# Patient Record
Sex: MALE | Race: WHITE | NOT HISPANIC OR LATINO
[De-identification: names, ages, dates, MRNs, and addresses within clinical notes are randomized per-mention and may not be internally consistent; named-entity substitution may affect disease eponyms.]

---

## 2017-03-30 PROBLEM — Z00.00 ENCOUNTER FOR PREVENTIVE HEALTH EXAMINATION: Status: ACTIVE | Noted: 2017-03-30

## 2017-04-03 ENCOUNTER — APPOINTMENT (OUTPATIENT)
Dept: GASTROENTEROLOGY | Facility: CLINIC | Age: 72
End: 2017-04-03

## 2017-04-03 VITALS
OXYGEN SATURATION: 98 % | DIASTOLIC BLOOD PRESSURE: 90 MMHG | HEIGHT: 73 IN | HEART RATE: 90 BPM | SYSTOLIC BLOOD PRESSURE: 162 MMHG | BODY MASS INDEX: 37.11 KG/M2 | WEIGHT: 280 LBS | TEMPERATURE: 98.6 F

## 2017-04-03 DIAGNOSIS — R19.7 DIARRHEA, UNSPECIFIED: ICD-10-CM

## 2017-04-03 DIAGNOSIS — Z86.79 PERSONAL HISTORY OF OTHER DISEASES OF THE CIRCULATORY SYSTEM: ICD-10-CM

## 2017-04-03 DIAGNOSIS — Z82.49 FAMILY HISTORY OF ISCHEMIC HEART DISEASE AND OTHER DISEASES OF THE CIRCULATORY SYSTEM: ICD-10-CM

## 2017-04-03 DIAGNOSIS — R11.0 NAUSEA: ICD-10-CM

## 2017-04-03 DIAGNOSIS — Z87.39 PERSONAL HISTORY OF OTHER DISEASES OF THE MUSCULOSKELETAL SYSTEM AND CONNECTIVE TISSUE: ICD-10-CM

## 2017-04-03 DIAGNOSIS — R10.9 UNSPECIFIED ABDOMINAL PAIN: ICD-10-CM

## 2017-04-03 RX ORDER — RANOLAZINE 1000 MG/1
1000 TABLET, FILM COATED, EXTENDED RELEASE ORAL
Qty: 60 | Refills: 0 | Status: ACTIVE | COMMUNITY
Start: 2017-04-03

## 2017-04-03 RX ORDER — AMLODIPINE AND ATORVASTATIN 10; 80 MG/1; MG/1
10-80 TABLET, COATED ORAL DAILY
Qty: 30 | Refills: 0 | Status: ACTIVE | COMMUNITY
Start: 2017-04-03

## 2017-04-03 RX ORDER — LACTOBACILLUS RHAMNOSUS GG 10B CELL
CAPSULE ORAL
Qty: 30 | Refills: 0 | Status: ACTIVE | OUTPATIENT
Start: 2017-04-03

## 2017-04-03 RX ORDER — EZETIMIBE 10 MG/1
10 TABLET ORAL DAILY
Qty: 30 | Refills: 0 | Status: ACTIVE | COMMUNITY
Start: 2017-04-03

## 2017-06-05 ENCOUNTER — APPOINTMENT (OUTPATIENT)
Dept: GASTROENTEROLOGY | Facility: CLINIC | Age: 72
End: 2017-06-05

## 2017-07-07 ENCOUNTER — OUTPATIENT (OUTPATIENT)
Dept: OUTPATIENT SERVICES | Facility: HOSPITAL | Age: 72
LOS: 1 days | End: 2017-07-07
Payer: MEDICARE

## 2017-07-07 VITALS
HEART RATE: 88 BPM | WEIGHT: 270.07 LBS | DIASTOLIC BLOOD PRESSURE: 95 MMHG | RESPIRATION RATE: 16 BRPM | OXYGEN SATURATION: 98 % | TEMPERATURE: 98 F | HEIGHT: 73 IN | SYSTOLIC BLOOD PRESSURE: 161 MMHG

## 2017-07-07 DIAGNOSIS — I48.91 UNSPECIFIED ATRIAL FIBRILLATION: ICD-10-CM

## 2017-07-07 DIAGNOSIS — Z01.818 ENCOUNTER FOR OTHER PREPROCEDURAL EXAMINATION: ICD-10-CM

## 2017-07-07 DIAGNOSIS — Z90.49 ACQUIRED ABSENCE OF OTHER SPECIFIED PARTS OF DIGESTIVE TRACT: Chronic | ICD-10-CM

## 2017-07-07 LAB
ALBUMIN SERPL ELPH-MCNC: 4.4 G/DL — SIGNIFICANT CHANGE UP (ref 3.3–5)
ALP SERPL-CCNC: 98 U/L — SIGNIFICANT CHANGE UP (ref 40–120)
ALT FLD-CCNC: 30 U/L RC — SIGNIFICANT CHANGE UP (ref 10–45)
ANION GAP SERPL CALC-SCNC: 13 MMOL/L — SIGNIFICANT CHANGE UP (ref 5–17)
AST SERPL-CCNC: 25 U/L — SIGNIFICANT CHANGE UP (ref 10–40)
BILIRUB SERPL-MCNC: 1.2 MG/DL — SIGNIFICANT CHANGE UP (ref 0.2–1.2)
BUN SERPL-MCNC: 13 MG/DL — SIGNIFICANT CHANGE UP (ref 7–23)
CALCIUM SERPL-MCNC: 9.7 MG/DL — SIGNIFICANT CHANGE UP (ref 8.4–10.5)
CHLORIDE SERPL-SCNC: 96 MMOL/L — SIGNIFICANT CHANGE UP (ref 96–108)
CO2 SERPL-SCNC: 29 MMOL/L — SIGNIFICANT CHANGE UP (ref 22–31)
CREAT SERPL-MCNC: 1.07 MG/DL — SIGNIFICANT CHANGE UP (ref 0.5–1.3)
GLUCOSE SERPL-MCNC: 153 MG/DL — HIGH (ref 70–99)
HCT VFR BLD CALC: 39.6 % — SIGNIFICANT CHANGE UP (ref 39–50)
HGB BLD-MCNC: 14.2 G/DL — SIGNIFICANT CHANGE UP (ref 13–17)
MCHC RBC-ENTMCNC: 34.3 PG — HIGH (ref 27–34)
MCHC RBC-ENTMCNC: 35.9 GM/DL — SIGNIFICANT CHANGE UP (ref 32–36)
MCV RBC AUTO: 95.7 FL — SIGNIFICANT CHANGE UP (ref 80–100)
PLATELET # BLD AUTO: 206 K/UL — SIGNIFICANT CHANGE UP (ref 150–400)
POTASSIUM SERPL-MCNC: 3.9 MMOL/L — SIGNIFICANT CHANGE UP (ref 3.5–5.3)
POTASSIUM SERPL-SCNC: 3.9 MMOL/L — SIGNIFICANT CHANGE UP (ref 3.5–5.3)
PROT SERPL-MCNC: 8.1 G/DL — SIGNIFICANT CHANGE UP (ref 6–8.3)
RBC # BLD: 4.14 M/UL — LOW (ref 4.2–5.8)
RBC # FLD: 12.1 % — SIGNIFICANT CHANGE UP (ref 10.3–14.5)
SODIUM SERPL-SCNC: 138 MMOL/L — SIGNIFICANT CHANGE UP (ref 135–145)
WBC # BLD: 9 K/UL — SIGNIFICANT CHANGE UP (ref 3.8–10.5)
WBC # FLD AUTO: 9 K/UL — SIGNIFICANT CHANGE UP (ref 3.8–10.5)

## 2017-07-07 PROCEDURE — 93010 ELECTROCARDIOGRAM REPORT: CPT

## 2017-07-07 PROCEDURE — 80053 COMPREHEN METABOLIC PANEL: CPT

## 2017-07-07 PROCEDURE — 85027 COMPLETE CBC AUTOMATED: CPT

## 2017-07-07 PROCEDURE — 93005 ELECTROCARDIOGRAM TRACING: CPT

## 2017-07-07 NOTE — H&P CARDIOLOGY - PMH
Atrial fibrillation    CAD (coronary artery disease)    DM type 2 (diabetes mellitus, type 2)    HLD (hyperlipidemia)    HTN (hypertension)    Obesity    Obesity    SVT (Supraventricular Tachycardia)  Cardioversion 6 yrs ago

## 2017-07-07 NOTE — H&P CARDIOLOGY - HISTORY OF PRESENT ILLNESS
This is a 71 yr old male, former smoker,  with PMH of HTN, HLD, DM Type 2 ( last A1C unknown, w/o any complication, well managed as per patient),  MI s/p CABG 2003, afib ( on Eliquis).  Seen and evaluated by Md Delgado, presents here today for PST only, pt is to come back on Monday on 7/10 for TAQUERIA/cardioversion.  Currently asymptomatic denies any chest, palpitations, dyspnea, dizziness, N&V, HA.

## 2017-07-10 ENCOUNTER — APPOINTMENT (OUTPATIENT)
Dept: GASTROENTEROLOGY | Facility: CLINIC | Age: 72
End: 2017-07-10

## 2017-07-10 ENCOUNTER — OUTPATIENT (OUTPATIENT)
Dept: OUTPATIENT SERVICES | Facility: HOSPITAL | Age: 72
LOS: 1 days | End: 2017-07-10
Payer: MEDICARE

## 2017-07-10 DIAGNOSIS — Z90.49 ACQUIRED ABSENCE OF OTHER SPECIFIED PARTS OF DIGESTIVE TRACT: Chronic | ICD-10-CM

## 2017-07-10 DIAGNOSIS — Z01.818 ENCOUNTER FOR OTHER PREPROCEDURAL EXAMINATION: ICD-10-CM

## 2017-07-10 DIAGNOSIS — I48.91 UNSPECIFIED ATRIAL FIBRILLATION: ICD-10-CM

## 2017-07-10 PROCEDURE — 92960 CARDIOVERSION ELECTRIC EXT: CPT

## 2017-07-10 PROCEDURE — 93005 ELECTROCARDIOGRAM TRACING: CPT

## 2017-07-10 PROCEDURE — 93010 ELECTROCARDIOGRAM REPORT: CPT

## 2017-07-10 RX ORDER — APIXABAN 2.5 MG/1
5 TABLET, FILM COATED ORAL ONCE
Qty: 0 | Refills: 0 | Status: DISCONTINUED | OUTPATIENT
Start: 2017-07-10 | End: 2017-07-25

## 2017-09-14 ENCOUNTER — APPOINTMENT (OUTPATIENT)
Dept: GASTROENTEROLOGY | Facility: AMBULATORY MEDICAL SERVICES | Age: 72
End: 2017-09-14

## 2018-01-16 ENCOUNTER — APPOINTMENT (OUTPATIENT)
Dept: CARDIOLOGY | Facility: CLINIC | Age: 73
End: 2018-01-16
Payer: MEDICARE

## 2018-01-16 ENCOUNTER — NON-APPOINTMENT (OUTPATIENT)
Age: 73
End: 2018-01-16

## 2018-01-16 VITALS
DIASTOLIC BLOOD PRESSURE: 98 MMHG | HEART RATE: 134 BPM | BODY MASS INDEX: 33.25 KG/M2 | SYSTOLIC BLOOD PRESSURE: 178 MMHG | WEIGHT: 252 LBS | OXYGEN SATURATION: 96 %

## 2018-01-16 DIAGNOSIS — Z78.9 OTHER SPECIFIED HEALTH STATUS: ICD-10-CM

## 2018-01-16 DIAGNOSIS — R06.02 SHORTNESS OF BREATH: ICD-10-CM

## 2018-01-16 PROCEDURE — 99204 OFFICE O/P NEW MOD 45 MIN: CPT

## 2018-01-16 PROCEDURE — 93000 ELECTROCARDIOGRAM COMPLETE: CPT

## 2018-01-16 RX ORDER — RIFAXIMIN 550 MG/1
550 TABLET ORAL
Qty: 42 | Refills: 3 | Status: DISCONTINUED | COMMUNITY
Start: 2017-04-03 | End: 2018-01-16

## 2018-01-16 RX ORDER — APIXABAN 5 MG/1
5 TABLET, FILM COATED ORAL
Qty: 60 | Refills: 6 | Status: ACTIVE | COMMUNITY

## 2018-01-16 RX ORDER — EZETIMIBE 10 MG/1
10 TABLET ORAL
Refills: 0 | Status: DISCONTINUED | COMMUNITY
End: 2018-01-16

## 2018-01-16 RX ORDER — RANOLAZINE 1000 MG/1
1000 TABLET, FILM COATED, EXTENDED RELEASE ORAL
Refills: 0 | Status: DISCONTINUED | COMMUNITY
End: 2018-01-16

## 2018-01-16 RX ORDER — METOPROLOL TARTRATE 50 MG/1
50 TABLET, FILM COATED ORAL
Refills: 0 | Status: DISCONTINUED | COMMUNITY
End: 2018-01-16

## 2018-01-18 ENCOUNTER — CLINICAL ADVICE (OUTPATIENT)
Age: 73
End: 2018-01-18

## 2018-01-24 ENCOUNTER — OUTPATIENT (OUTPATIENT)
Dept: INPATIENT UNIT | Facility: HOSPITAL | Age: 73
LOS: 1 days | End: 2018-01-24
Payer: MEDICARE

## 2018-01-24 ENCOUNTER — TRANSCRIPTION ENCOUNTER (OUTPATIENT)
Age: 73
End: 2018-01-24

## 2018-01-24 VITALS
TEMPERATURE: 97 F | SYSTOLIC BLOOD PRESSURE: 161 MMHG | HEIGHT: 73 IN | OXYGEN SATURATION: 98 % | HEART RATE: 85 BPM | WEIGHT: 251.99 LBS | DIASTOLIC BLOOD PRESSURE: 87 MMHG | RESPIRATION RATE: 16 BRPM

## 2018-01-24 DIAGNOSIS — I49.9 CARDIAC ARRHYTHMIA, UNSPECIFIED: ICD-10-CM

## 2018-01-24 DIAGNOSIS — Z90.49 ACQUIRED ABSENCE OF OTHER SPECIFIED PARTS OF DIGESTIVE TRACT: Chronic | ICD-10-CM

## 2018-01-24 LAB
ALBUMIN SERPL ELPH-MCNC: 4.6 G/DL — SIGNIFICANT CHANGE UP (ref 3.3–5)
ALP SERPL-CCNC: 101 U/L — SIGNIFICANT CHANGE UP (ref 40–120)
ALT FLD-CCNC: 24 U/L RC — SIGNIFICANT CHANGE UP (ref 10–45)
AST SERPL-CCNC: 24 U/L — SIGNIFICANT CHANGE UP (ref 10–40)
BILIRUB SERPL-MCNC: 1.2 MG/DL — SIGNIFICANT CHANGE UP (ref 0.2–1.2)
BUN SERPL-MCNC: 17 MG/DL — SIGNIFICANT CHANGE UP (ref 7–23)
CALCIUM SERPL-MCNC: 9.8 MG/DL — SIGNIFICANT CHANGE UP (ref 8.4–10.5)
CHLORIDE SERPL-SCNC: 98 MMOL/L — SIGNIFICANT CHANGE UP (ref 96–108)
CO2 SERPL-SCNC: 26 MMOL/L — SIGNIFICANT CHANGE UP (ref 22–31)
CREAT SERPL-MCNC: 1.04 MG/DL — SIGNIFICANT CHANGE UP (ref 0.5–1.3)
GLUCOSE SERPL-MCNC: 165 MG/DL — HIGH (ref 70–99)
HCT VFR BLD CALC: 42.6 % — SIGNIFICANT CHANGE UP (ref 39–50)
HGB BLD-MCNC: 15.1 G/DL — SIGNIFICANT CHANGE UP (ref 13–17)
INR BLD: 1.33 RATIO — HIGH (ref 0.88–1.16)
MCHC RBC-ENTMCNC: 33.5 PG — SIGNIFICANT CHANGE UP (ref 27–34)
MCHC RBC-ENTMCNC: 35.6 GM/DL — SIGNIFICANT CHANGE UP (ref 32–36)
MCV RBC AUTO: 94.2 FL — SIGNIFICANT CHANGE UP (ref 80–100)
PLATELET # BLD AUTO: 198 K/UL — SIGNIFICANT CHANGE UP (ref 150–400)
POTASSIUM SERPL-MCNC: 3.7 MMOL/L — SIGNIFICANT CHANGE UP (ref 3.5–5.3)
POTASSIUM SERPL-SCNC: 3.7 MMOL/L — SIGNIFICANT CHANGE UP (ref 3.5–5.3)
PROT SERPL-MCNC: 8.8 G/DL — HIGH (ref 6–8.3)
PROTHROM AB SERPL-ACNC: 14.5 SEC — HIGH (ref 9.8–12.7)
RBC # BLD: 4.52 M/UL — SIGNIFICANT CHANGE UP (ref 4.2–5.8)
RBC # FLD: 12 % — SIGNIFICANT CHANGE UP (ref 10.3–14.5)
SODIUM SERPL-SCNC: 139 MMOL/L — SIGNIFICANT CHANGE UP (ref 135–145)
WBC # BLD: 9.3 K/UL — SIGNIFICANT CHANGE UP (ref 3.8–10.5)
WBC # FLD AUTO: 9.3 K/UL — SIGNIFICANT CHANGE UP (ref 3.8–10.5)

## 2018-01-24 PROCEDURE — 93010 ELECTROCARDIOGRAM REPORT: CPT

## 2018-01-24 PROCEDURE — 0387T: CPT | Mod: Q0,KX

## 2018-01-24 RX ORDER — ALISKIREN HEMIFUMARATE 300 MG/1
150 TABLET, FILM COATED ORAL DAILY
Qty: 0 | Refills: 0 | Status: DISCONTINUED | OUTPATIENT
Start: 2018-01-24 | End: 2018-01-24

## 2018-01-24 RX ORDER — METOPROLOL TARTRATE 50 MG
1 TABLET ORAL
Qty: 0 | Refills: 0 | COMMUNITY

## 2018-01-24 RX ORDER — ATORVASTATIN CALCIUM 80 MG/1
80 TABLET, FILM COATED ORAL AT BEDTIME
Qty: 0 | Refills: 0 | Status: DISCONTINUED | OUTPATIENT
Start: 2018-01-24 | End: 2018-01-25

## 2018-01-24 RX ORDER — AMLODIPINE BESYLATE 2.5 MG/1
10 TABLET ORAL DAILY
Qty: 0 | Refills: 0 | Status: DISCONTINUED | OUTPATIENT
Start: 2018-01-24 | End: 2018-01-25

## 2018-01-24 RX ORDER — APIXABAN 2.5 MG/1
1 TABLET, FILM COATED ORAL
Qty: 0 | Refills: 0 | COMMUNITY

## 2018-01-24 RX ORDER — METOPROLOL TARTRATE 50 MG
50 TABLET ORAL DAILY
Qty: 0 | Refills: 0 | Status: DISCONTINUED | OUTPATIENT
Start: 2018-01-24 | End: 2018-01-25

## 2018-01-24 RX ORDER — RANOLAZINE 500 MG/1
1000 TABLET, FILM COATED, EXTENDED RELEASE ORAL
Qty: 0 | Refills: 0 | Status: DISCONTINUED | OUTPATIENT
Start: 2018-01-24 | End: 2018-01-25

## 2018-01-24 RX ORDER — DIGOXIN 250 MCG
1 TABLET ORAL
Qty: 0 | Refills: 0 | COMMUNITY

## 2018-01-24 RX ORDER — ALISKIREN HEMIFUMARATE 300 MG/1
150 TABLET, FILM COATED ORAL DAILY
Qty: 0 | Refills: 0 | Status: DISCONTINUED | OUTPATIENT
Start: 2018-01-24 | End: 2018-01-25

## 2018-01-24 RX ADMIN — RANOLAZINE 1000 MILLIGRAM(S): 500 TABLET, FILM COATED, EXTENDED RELEASE ORAL at 17:37

## 2018-01-24 RX ADMIN — Medication 50 MILLIGRAM(S): at 12:06

## 2018-01-24 RX ADMIN — ALISKIREN HEMIFUMARATE 150 MILLIGRAM(S): 300 TABLET, FILM COATED ORAL at 14:20

## 2018-01-24 RX ADMIN — AMLODIPINE BESYLATE 10 MILLIGRAM(S): 2.5 TABLET ORAL at 12:06

## 2018-01-24 RX ADMIN — ATORVASTATIN CALCIUM 80 MILLIGRAM(S): 80 TABLET, FILM COATED ORAL at 14:20

## 2018-01-24 NOTE — CONSULT NOTE ADULT - SUBJECTIVE AND OBJECTIVE BOX
CHIEF COMPLAINT: Chest Pain    HPI:  This is a 72 yr old  obese male, former smoker,  with PMH of HTN, HLD, DM Type 2 (w/o any complication, well managed as per patient w/ diet - not on Metformin for "years" as per patient),  MI s/p CABG 2003, Afib ( on Eliquis), had Holter monitor which was positive for 3.87 sec pause. Referred here today for MICRA pacemaker implant. Currently  asymptomatic denies any chest, palpitations, dyspnea, dizziness, N&V, HA. (24 Jan 2018 08:33).  Patient is now s/p MICRA implant.  He reports feeling well.  No chest pain or SOB      PAST MEDICAL & SURGICAL HISTORY:  HLD (hyperlipidemia)  Atrial fibrillation  Obesity  SVT (Supraventricular Tachycardia): Cardioversion 6 yrs ago  DM type 2 (diabetes mellitus, type 2)  HTN (hypertension)  CAD (coronary artery disease)  Obesity  History of cholecystectomy  CAD (coronary artery disease): s/p CABG - 3 vessel (2003)      Allergies    No Known Allergies    Intolerances        SOCIAL HISTORY    Smoking Hx: Former  ETOH Hx: Former ETOH abuser  Marital Status:   Occupational Hx: Retired    FAMILY HISTORY:  No pertinent family history in first degree relatives      MEDICATIONS:  aliskiren 150 milliGRAM(s) Oral daily  amLODIPine   Tablet 10 milliGRAM(s) Oral daily  atorvastatin 80 milliGRAM(s) Oral at bedtime  metoprolol succinate ER 50 milliGRAM(s) Oral daily  ranolazine 1000 milliGRAM(s) Oral two times a day      REVIEW OF SYSTEMS:    CONSTITUTIONAL: No weakness, fevers or chills  EYES/ENT: No visual changes;  No vertigo or throat pain   NECK: No pain or stiffness  RESPIRATORY: No cough, wheezing, hemoptysis; No shortness of breath  CARDIOVASCULAR: No chest pain or palpitations  GASTROINTESTINAL: No abdominal or epigastric pain. No nausea, vomiting, or hematemesis; No diarrhea or constipation. No melena or hematochezia.  GENITOURINARY: No dysuria, frequency or hematuria  NEUROLOGICAL: No numbness or weakness  SKIN: No itching, burning, rashes, or lesions   All other review of systems is negative unless indicated above    Vital Signs Last 24 Hrs  T(C): 36.5 (24 Jan 2018 11:35), Max: 36.5 (24 Jan 2018 11:35)  T(F): 97.7 (24 Jan 2018 11:35), Max: 97.7 (24 Jan 2018 11:35)  HR: 73 (24 Jan 2018 12:20) (73 - 87)  BP: 132/74 (24 Jan 2018 12:20) (125/89 - 161/87)  BP(mean): 111 (24 Jan 2018 08:33) (111 - 111)  RR: 20 (24 Jan 2018 12:20) (16 - 20)  SpO2: 97% (24 Jan 2018 12:20) (97% - 98%)    I&O's Summary      PHYSICAL EXAM:    Constitutional: NAD, awake and alert, well-developed  HEENT: PERR, EOMI  Neck: soft and supple, No LAD, No JVD  Respiratory: Breath sounds are clear bilaterally, No wheezing, rales or rhonchi  Cardiovascular: Regular rate and rhythm, normal S1 and S2,  no murmurs, gallops or rubs  Gastrointestinal: Bowel Sounds present, soft, nontender.   Extremities: No peripheral edema. No clubbing or cyanosis.  Vascular: 2+ peripheral pulses  Neurological: A/O x 3, no focal deficits  Musculoskeletal: no calf tenderness.  Skin: No rashes.      LABS: All Labs Reviewed:                        15.1   9.3   )-----------( 198      ( 24 Jan 2018 08:21 )             42.6     24 Jan 2018 08:22    139    |  98     |  17     ----------------------------<  165    3.7     |  26     |  1.04     Ca    9.8        24 Jan 2018 08:22    TPro  8.8    /  Alb  4.6    /  TBili  1.2    /  DBili  x      /  AST  24     /  ALT  24     /  AlkPhos  101    24 Jan 2018 08:22    PT/INR - ( 24 Jan 2018 08:22 )   PT: 14.5 sec;   INR: 1.33 ratio              RADIOLOGY/EKG: Atrial fibrillation moderate ventricular response

## 2018-01-24 NOTE — H&P CARDIOLOGY - HISTORY OF PRESENT ILLNESS
This is a 72 yr old  obese male, former smoker,  with PMH of HTN, HLD, DM Type 2 ( last A1C unknown, w/o any complication, well managed as per patient w/ diet - not on Metformin for "years" as per patient),  MI s/p CABG 2003, Afib ( on Eliquis, last dose 1/23).  Seen and evaluated by Md Delgado; had Holter monitor which was positive for 3.87 sec pause. Referred here today for MICRA pacemaker implant. Currently  asymptomatic denies any chest, palpitations, dyspnea, dizziness, N&V, HA.

## 2018-01-24 NOTE — DISCHARGE NOTE ADULT - CARE PLAN
Principal Discharge DX:	Atrial fibrillation  Goal:	Your heart rate and rhythm will be controlled.  Assessment and plan of treatment:	Continue with your cardiologist and primary care MD. Continue your current medications. Call your physician for palpitations, feelings of rapid heart beat, lightheadedness, or dizziness.  Secondary Diagnosis:	DM type 2 (diabetes mellitus, type 2)  Goal:	Your hemoglobin A1C will be between 7-8.  Assessment and plan of treatment:	Continue to follow up with your primary care MD or your endocrinologist. Follow a heart healthy diabetic diet. Call your MD if your sugar level is greater than 250mg/dL or less than 100mg/dL on 2 occasions. Know signs of low blood sugar, such as: dizziness, shakiness, sweating, confusion, hunger, nervousness-drink 4 ounces apple juice call your doctor. Know early signs of high blood sugar; frequent urination,  increased thirst, blurry vision, fatigue, headache. Follow with other practitioners to care for your diabetes, such as ophthalmologist and podiatrist every 3-6months.  Secondary Diagnosis:	HTN (hypertension)  Goal:	Your blood pressure will be controlled.  Assessment and plan of treatment:	Continue with your blood pressure medications; eat a heart healthy diet with low salt diet; exercise regularly (consult with your physician or cardiologist first); maintain a heart healthy weight; if you smoke - quit (A resource to help you stop smoking is the [facility] [email or web address] [facility] – phone number 489-064-3757.); include healthy ways to manage stress. Continue to follow with your primary care physician or cardiologist.  Secondary Diagnosis:	HLD (hyperlipidemia)  Goal:	Your LDL cholesterol will be less than 70mg/dL  Assessment and plan of treatment:	Continue with your cholesterol medications. Eat a heart healthy diet that is low in saturated fats and salt, and includes whole grains, fruits, vegetables and lean protein; exercise regularly (consult with your physician or cardiologist first); maintain a heart healthy weight; if you smoke - quit (A resource to help you stop smoking is the [facility] [email or web address] Control – phone number [phone].). Continue to follow with your primary physician or cardiologist.

## 2018-01-24 NOTE — DISCHARGE NOTE ADULT - PATIENT PORTAL LINK FT
“You can access the FollowHealth Patient Portal, offered by [facility], by registering with the following website: [email or web address]”

## 2018-01-24 NOTE — PROGRESS NOTE ADULT - SUBJECTIVE AND OBJECTIVE BOX
s/p micro PPM insertion via right groin for tachybrady syndrome.  Right groin site benign, VSS.   , A1C added.   CXR pending.     continue to monitor, right groin stitch to removed in am,  Eliquis start on 1/26(Friday).   Continue current medication include Metoprolol.

## 2018-01-24 NOTE — CHART NOTE - NSCHARTNOTEFT_GEN_A_CORE
Brief Procedure Note    Pre-op Diagnosis: Tachybrady syndrome    Post-op Diagnosis: Tachybrady syndrome    Procedure: Micra placement    Electrophysiologist: Génesis Henderson MD    Assistant: Marquis Longoria MD    Anesthesia: Local/IV sedation    Access: RFV    Description:  access RFV, 6Fr sheath placed  super stiff amplatz wire placed and 12/18Fr dilators placed  Micra Introducer sheath placed  Micra delivered to RV apical septum  Device tested and released  Sheath removed, figure of 8 stitch placed    Complications: none    EBL: 10cc    Disposition:  to [facility] in stable condition    Plan:  bedrest 4 hours  CXR (PA/LAT)  remove stitch in Am  restart eliquis Friday AM

## 2018-01-24 NOTE — CONSULT NOTE ADULT - ASSESSMENT
71 yo male with atrial fibrillation and 3.87 second pause now s/p MICRA PPM implant.  Doing well post procedure.  Metroprolol 50 mg qd now that the PM is in and no need to worry about bradyarrhythmia.

## 2018-01-24 NOTE — PATIENT PROFILE ADULT. - AS SC BRADEN SENSORY
Normal vision: sees adequately in most situations; can see medication labels, newsprint (4) no impairment

## 2018-01-24 NOTE — DISCHARGE NOTE ADULT - FINDINGS/TREATMENT
s/p cath bradytherapy prox/mid RCA via right femoral artery access. successful placement permanent pacemaker

## 2018-01-24 NOTE — DISCHARGE NOTE ADULT - HOSPITAL COURSE
72 yr old  obese male, former smoker,  with PMH of HTN, HLD, DM Type 2 ( last A1C unknown, w/o any complication, well managed as per patient w/ diet - not on Metformin for "years" as per patient),  MI s/p CABG 2003, Afib ( on Eliquis, last dose 1/23).  Seen and evaluated by Md Delgado; had Holter monitor which was positive for 3.87 sec pause. Referred here today for MICRA pacemaker implant. Currently  asymptomatic denies any chest, palpitations, dyspnea, dizziness, N&V, HA. Pt is now s/p cath bradytherapy prox/mid RCA via right femoral artery access.

## 2018-01-24 NOTE — PROGRESS NOTE ADULT - SUBJECTIVE AND OBJECTIVE BOX
71 yo male with atrial fibrillation and 3.87 second pause now s/p MICRA PPM implant.  Right groin access benign.   VSS. no chest pain or SOB.   for CXR PA & Lateral.   Eliquis to resume on Friday.   Plan to d/c home in AM if stable.

## 2018-01-24 NOTE — DISCHARGE NOTE ADULT - PLAN OF CARE
Your heart rate and rhythm will be controlled. Continue with your cardiologist and primary care MD. Continue your current medications. Call your physician for palpitations, feelings of rapid heart beat, lightheadedness, or dizziness. Your hemoglobin A1C will be between 7-8. Continue to follow up with your primary care MD or your endocrinologist. Follow a heart healthy diabetic diet. Call your MD if your sugar level is greater than 250mg/dL or less than 100mg/dL on 2 occasions. Know signs of low blood sugar, such as: dizziness, shakiness, sweating, confusion, hunger, nervousness-drink 4 ounces apple juice call your doctor. Know early signs of high blood sugar; frequent urination,  increased thirst, blurry vision, fatigue, headache. Follow with other practitioners to care for your diabetes, such as ophthalmologist and podiatrist every 3-6months. Your blood pressure will be controlled. Continue with your blood pressure medications; eat a heart healthy diet with low salt diet; exercise regularly (consult with your physician or cardiologist first); maintain a heart healthy weight; if you smoke - quit (A resource to help you stop smoking is the [facility] – phone number [phone].); include healthy ways to manage stress. Continue to follow with your primary care physician or cardiologist. Your LDL cholesterol will be less than 70mg/dL Continue with your cholesterol medications. Eat a heart healthy diet that is low in saturated fats and salt, and includes whole grains, fruits, vegetables and lean protein; exercise regularly (consult with your physician or cardiologist first); maintain a heart healthy weight; if you smoke - quit (A resource to help you stop smoking is the [facility] – phone number [phone].). Continue to follow with your primary physician or cardiologist.

## 2018-01-24 NOTE — DISCHARGE NOTE ADULT - ADDITIONAL INSTRUCTIONS
No heavy lifting, strenuous activity, bending, straining, or unnecessary stair climbing for 2 weeks. No driving for 2 days. You may shower 24 hours following the procedure but avoid baths/swimming for 1 week. Check your groin site for bleeding and/or swelling daily following procedure and call your doctor immediately if it occurs or if you experience increased pain at the site. Follow up with your cardiologist in 1-2 weeks. You may call [facility] if you have any questions/concerns regarding your procedure [phone]. No heavy lifting, strenuous activity, bending, straining, or unnecessary stair climbing for 2 weeks. No driving for 2 days. You may shower 24 hours following the procedure but avoid baths/swimming for 1 week. Check your groin site for bleeding and/or swelling daily following procedure and call your doctor immediately if it occurs or if you experience increased pain at the site. Follow up with your cardiologist in 1-2 weeks. You may call [facility] if you have any questions/concerns regarding your procedure [phone].    ******* RESTART ELIQUIS FRIDAY 01/25 ******************

## 2018-01-24 NOTE — DISCHARGE NOTE ADULT - MEDICATION SUMMARY - MEDICATIONS TO TAKE
I will START or STAY ON the medications listed below when I get home from the hospital:    Ranexa 1000 mg oral tablet, extended release  -- 1 tab(s) by mouth 2 times a day  -- Indication: For Heart disease     Eliquis 5 mg oral tablet  -- 1 tab(s) by mouth 2 times a day  Start on 1/26 Friday.  -- Indication: For Afib    ezetimibe 10 mg oral tablet  -- 1 tab(s) by mouth once a day  -- Indication: For High cholesterol    amlodipine-atorvastatin 10 mg-80 mg oral tablet  -- 1 tab(s) by mouth once a day  -- Indication: For High blood pressure    metoprolol succinate 50 mg oral tablet, extended release  -- 0.5 tab(s) by mouth once a day  -- Indication: For High blood pressure    Tekturna 150 mg oral tablet  -- 1 tab(s) by mouth once a day  -- Indication: For High blood pressure

## 2018-01-24 NOTE — DISCHARGE NOTE ADULT - CARE PROVIDER_API CALL
Génesis Henderson (MD), Cardiac Electrophysiology; Cardiovascular Disease; Internal Medicine  [address] [place] [address]  Phone: [phone]  Fax: [phone] Génesis Henderson), Cardiac Electrophysiology; Cardiovascular Disease; Internal Medicine  [address] [place] [address]  Phone: [phone]  Fax: [phone]    Kulwinder Delgado), Cardiovascular Disease  [address] [place] [id number]  Phone: [phone]  Fax: [phone]

## 2018-01-25 VITALS
RESPIRATION RATE: 18 BRPM | TEMPERATURE: 98 F | OXYGEN SATURATION: 97 % | SYSTOLIC BLOOD PRESSURE: 136 MMHG | DIASTOLIC BLOOD PRESSURE: 81 MMHG | HEART RATE: 88 BPM

## 2018-01-25 LAB
ANION GAP SERPL CALC-SCNC: 12 MMOL/L — SIGNIFICANT CHANGE UP (ref 5–17)
BUN SERPL-MCNC: 20 MG/DL — SIGNIFICANT CHANGE UP (ref 7–23)
CALCIUM SERPL-MCNC: 9.3 MG/DL — SIGNIFICANT CHANGE UP (ref 8.4–10.5)
CHLORIDE SERPL-SCNC: 96 MMOL/L — SIGNIFICANT CHANGE UP (ref 96–108)
CO2 SERPL-SCNC: 29 MMOL/L — SIGNIFICANT CHANGE UP (ref 22–31)
CREAT SERPL-MCNC: 1.02 MG/DL — SIGNIFICANT CHANGE UP (ref 0.5–1.3)
GLUCOSE SERPL-MCNC: 139 MG/DL — HIGH (ref 70–99)
HCT VFR BLD CALC: 37.6 % — LOW (ref 39–50)
HCT VFR BLD CALC: 38.2 % — LOW (ref 39–50)
HGB BLD-MCNC: 13.7 G/DL — SIGNIFICANT CHANGE UP (ref 13–17)
HGB BLD-MCNC: 13.8 G/DL — SIGNIFICANT CHANGE UP (ref 13–17)
MCHC RBC-ENTMCNC: 34.2 PG — HIGH (ref 27–34)
MCHC RBC-ENTMCNC: 36.3 GM/DL — HIGH (ref 32–36)
MCV RBC AUTO: 94.3 FL — SIGNIFICANT CHANGE UP (ref 80–100)
PLATELET # BLD AUTO: 171 K/UL — SIGNIFICANT CHANGE UP (ref 150–400)
POTASSIUM SERPL-MCNC: 3.7 MMOL/L — SIGNIFICANT CHANGE UP (ref 3.5–5.3)
POTASSIUM SERPL-SCNC: 3.7 MMOL/L — SIGNIFICANT CHANGE UP (ref 3.5–5.3)
RBC # BLD: 3.99 M/UL — LOW (ref 4.2–5.8)
RBC # FLD: 12 % — SIGNIFICANT CHANGE UP (ref 10.3–14.5)
SODIUM SERPL-SCNC: 137 MMOL/L — SIGNIFICANT CHANGE UP (ref 135–145)
WBC # BLD: 8.6 K/UL — SIGNIFICANT CHANGE UP (ref 3.8–10.5)
WBC # FLD AUTO: 8.6 K/UL — SIGNIFICANT CHANGE UP (ref 3.8–10.5)

## 2018-01-25 PROCEDURE — 85027 COMPLETE CBC AUTOMATED: CPT

## 2018-01-25 PROCEDURE — 93010 ELECTROCARDIOGRAM REPORT: CPT

## 2018-01-25 PROCEDURE — 80048 BASIC METABOLIC PNL TOTAL CA: CPT

## 2018-01-25 PROCEDURE — 93005 ELECTROCARDIOGRAM TRACING: CPT

## 2018-01-25 PROCEDURE — 83036 HEMOGLOBIN GLYCOSYLATED A1C: CPT

## 2018-01-25 PROCEDURE — 71046 X-RAY EXAM CHEST 2 VIEWS: CPT

## 2018-01-25 PROCEDURE — 0387T: CPT | Mod: KX,Q0

## 2018-01-25 PROCEDURE — 71045 X-RAY EXAM CHEST 1 VIEW: CPT

## 2018-01-25 PROCEDURE — 85018 HEMOGLOBIN: CPT

## 2018-01-25 PROCEDURE — C1894: CPT

## 2018-01-25 PROCEDURE — C1786: CPT

## 2018-01-25 PROCEDURE — 82962 GLUCOSE BLOOD TEST: CPT

## 2018-01-25 PROCEDURE — C1769: CPT

## 2018-01-25 PROCEDURE — 85610 PROTHROMBIN TIME: CPT

## 2018-01-25 PROCEDURE — 80053 COMPREHEN METABOLIC PANEL: CPT

## 2018-01-25 RX ORDER — DEXTROSE 50 % IN WATER 50 %
12.5 SYRINGE (ML) INTRAVENOUS ONCE
Qty: 0 | Refills: 0 | Status: DISCONTINUED | OUTPATIENT
Start: 2018-01-25 | End: 2018-01-25

## 2018-01-25 RX ORDER — DEXTROSE 50 % IN WATER 50 %
25 SYRINGE (ML) INTRAVENOUS ONCE
Qty: 0 | Refills: 0 | Status: DISCONTINUED | OUTPATIENT
Start: 2018-01-25 | End: 2018-01-25

## 2018-01-25 RX ORDER — INSULIN LISPRO 100/ML
VIAL (ML) SUBCUTANEOUS AT BEDTIME
Qty: 0 | Refills: 0 | Status: DISCONTINUED | OUTPATIENT
Start: 2018-01-25 | End: 2018-01-25

## 2018-01-25 RX ORDER — DEXTROSE 50 % IN WATER 50 %
1 SYRINGE (ML) INTRAVENOUS ONCE
Qty: 0 | Refills: 0 | Status: DISCONTINUED | OUTPATIENT
Start: 2018-01-25 | End: 2018-01-25

## 2018-01-25 RX ORDER — INSULIN LISPRO 100/ML
VIAL (ML) SUBCUTANEOUS
Qty: 0 | Refills: 0 | Status: DISCONTINUED | OUTPATIENT
Start: 2018-01-25 | End: 2018-01-25

## 2018-01-25 RX ORDER — GLUCAGON INJECTION, SOLUTION 0.5 MG/.1ML
1 INJECTION, SOLUTION SUBCUTANEOUS ONCE
Qty: 0 | Refills: 0 | Status: DISCONTINUED | OUTPATIENT
Start: 2018-01-25 | End: 2018-01-25

## 2018-01-25 RX ORDER — SODIUM CHLORIDE 9 MG/ML
1000 INJECTION, SOLUTION INTRAVENOUS
Qty: 0 | Refills: 0 | Status: DISCONTINUED | OUTPATIENT
Start: 2018-01-25 | End: 2018-01-25

## 2018-01-25 RX ADMIN — RANOLAZINE 1000 MILLIGRAM(S): 500 TABLET, FILM COATED, EXTENDED RELEASE ORAL at 09:47

## 2018-01-25 RX ADMIN — Medication 50 MILLIGRAM(S): at 05:24

## 2018-01-25 NOTE — PROGRESS NOTE ADULT - ASSESSMENT
73 yo male with atrial fibrillation and 3.87 second pause now s/p MICRA PPM implant.  Doing well post procedure. 71 yo male with atrial fibrillation and 3.87 second pause now s/p MICRA PPM implant.  Doing well post procedure.  Expect discharge today on current medications.  Will follow up in office next week.

## 2018-01-25 NOTE — PROGRESS NOTE ADULT - SUBJECTIVE AND OBJECTIVE BOX
Resting comfortably in chair; in no acute distress. He  denies CP, palpitations, dizziness or SOB. Breathing nonlabored; ctab. +s1 +s2.   Right groin site stitch-free,soft/ no hematoma/ active external bleed. +2 bilateral radial and DP pulses.    VS : 140/75 98% 18 36.8 72 bpm   Tele: Afib Vpaced 70's to 80's     ASSESSMENT/PLAN: 	  -Patient tolerated procedure well  -Post micra PPM  teaching reviewed with patient.   -Instructions provided to patient.  -Patient to follow up  in [facility] Clinic on   - CXr PA/lateral prelim result reviewed with Dr Lazo ( Radiologist); pacemaker in RV, no pneumothorax, clear lungs/ no acute changes  - May restart Eliquis tomorrow am Resting comfortably in chair; in no acute distress. He  denies CP, palpitations, dizziness or SOB. Breathing nonlabored; ctab. +s1 +s2.   Right groin site stitch-free,soft/ no hematoma/ active external bleed. +2 bilateral radial and DP pulses.    VS : 140/75 98% 18 36.8 72 bpm   Tele: Afib Vpaced 70's to 80's     ASSESSMENT/PLAN: 	  -Patient tolerated procedure well  -Post micra PPM  teaching reviewed with patient.   - Booklet with further Instructions and temporary ID card provided to patient.  -Patient to follow up  in EP Clinic on 2/8/18 at 9:50 am   - CXr PA/lateral prelim result reviewed with Dr Lazo ( Radiologist); pacemaker in RV, no pneumothorax, clear lungs/ no acute change  - Pacemaker check and carelink provided by MDT rep De La Cruz  - May restart Eliquis tomorrow am   -Patient cleared for discharge as d/w Dr Beatriz Bush [facility]-bc  [id number]

## 2018-01-25 NOTE — PROGRESS NOTE ADULT - SUBJECTIVE AND OBJECTIVE BOX
72y old  Male who presents with a chief complaint of afib and cardiac pauses present for  Micra PPM insertion (2018 16:01)      Allergies    No Known Allergies    Intolerances        Medications:  aliskiren 150 milliGRAM(s) Oral daily  amLODIPine   Tablet 10 milliGRAM(s) Oral daily  atorvastatin 80 milliGRAM(s) Oral at bedtime  metoprolol succinate ER 50 milliGRAM(s) Oral daily  ranolazine 1000 milliGRAM(s) Oral two times a day      Vitals:  T(C): 36.8 (18 @ 19:17), Max: 36.8 (18 @ 19:17)  HR: 68 (18 @ 19:17) (68 - 87)  BP: 140/93 (18 @ 19:17) (125/89 - 161/87)  BP(mean): 111 (18 @ 08:33) (111 - 111)  RR: 18 (18 @ 19:17) (15 - 20)  SpO2: 98% (18 @ 19:17) (97% - 98%)  Wt(kg): --  Daily Height in cm: 185.42 (2018 08:33)    Daily Weight in k.3 (2018 08:33)  I&O's Summary    2018 07:01  -  2018 03:44  --------------------------------------------------------  IN: 720 mL / OUT: 0 mL / NET: 720 mL      Physical Exam:  Appearance: Normal  Eyes: PERRL, EOMI  HENT: Normal oral muscosa, NC/AT  Cardiovascular: S1S2, RRR, No M/R/G, no JVD, No Lower extremity edema  Procedural Access Site: No hematoma, Non-tender to palpation, 2+ pulse, No bruit, No Ecchymosis  Respiratory: Clear to auscultation bilaterally  Gastrointestinal: Soft, Non tender, Normal Bowel Sounds  Musculoskeletal: No clubbing, No joint deformity   Neurologic: Non-focal  Lymphatic: No lymphadenopathy  Psychiatry: AAOx3, Mood & affect appropriate  Skin: No rashes, No ecchymoses, No cyanosis        137  |  96  |  20  ----------------------------<  139<H>  3.7   |  29  |  1.02    Ca    9.3      2018 00:44    TPro  8.8<H>  /  Alb  4.6  /  TBili  1.2  /  DBili  x   /  AST  24  /  ALT  24  /  AlkPhos  101      PT/INR - ( 2018 08:22 )   PT: 14.5 sec;   INR: 1.33 ratio           Hgb A1c Hemoglobin A1C, Whole Blood: 7.9 % ( @ 14:34)          ECG:    Interpretation of Telemetry:    ASSESSMENT/PLAN  72y old  Male who presents with a chief complaint of afib and cardiac pauses present for  Micra PPM insertion (2018 16:01). Pt tolerated the procedure well, site benign. Overnight remained uneventful. Post-procedure discharge instructions discussed and questions addressed 72y old  Male who presents with a chief complaint of afib and cardiac pauses present for  Micra PPM insertion (2018 16:01)      Allergies    No Known Allergies    Intolerances        Medications:  aliskiren 150 milliGRAM(s) Oral daily  amLODIPine   Tablet 10 milliGRAM(s) Oral daily  atorvastatin 80 milliGRAM(s) Oral at bedtime  metoprolol succinate ER 50 milliGRAM(s) Oral daily  ranolazine 1000 milliGRAM(s) Oral two times a day      Vitals:  T(C): 36.8 (18 @ 19:17), Max: 36.8 (18 @ 19:17)  HR: 68 (18 @ 19:17) (68 - 87)  BP: 140/93 (18 @ 19:17) (125/89 - 161/87)  BP(mean): 111 (18 @ 08:33) (111 - 111)  RR: 18 (18 @ 19:17) (15 - 20)  SpO2: 98% (18 @ 19:17) (97% - 98%)  Wt(kg): --  Daily Height in cm: 185.42 (2018 08:33)    Daily Weight in k.3 (2018 08:33)  I&O's Summary    2018 07:01  -  2018 03:44  --------------------------------------------------------  IN: 720 mL / OUT: 0 mL / NET: 720 mL      Physical Exam:  Appearance: Normal  Eyes: PERRL, EOMI  HENT: Normal oral muscosa, NC/AT  Cardiovascular: S1S2, RRR, No M/R/G, no JVD, No Lower extremity edema  Procedural Access Site: No hematoma, Non-tender to palpation, 2+ pulse, No bruit, No Ecchymosis  Respiratory: Clear to auscultation bilaterally  Gastrointestinal: Soft, Non tender, Normal Bowel Sounds  Musculoskeletal: No clubbing, No joint deformity   Neurologic: Non-focal  Lymphatic: No lymphadenopathy  Psychiatry: AAOx3, Mood & affect appropriate  Skin: No rashes, No ecchymoses, No cyanosis        137  |  96  |  20  ----------------------------<  139<H>  3.7   |  29  |  1.02    Ca    9.3      2018 00:44    TPro  8.8<H>  /  Alb  4.6  /  TBili  1.2  /  DBili  x   /  AST  24  /  ALT  24  /  AlkPhos  101      PT/INR - ( 2018 08:22 )   PT: 14.5 sec;   INR: 1.33 ratio           Hgb A1c Hemoglobin A1C, Whole Blood: 7.9 % ( @ 14:34)          ECG: V paced  66bpm     Interpretation of Telemetry:    ASSESSMENT/PLAN  72y old  Male who presents with a chief complaint of afib and cardiac pauses present for  Micra PPM insertion (2018 16:01). Pt tolerated the procedure well, site benign. Overnight remained uneventful. Post-procedure discharge instructions discussed and questions addressed

## 2018-01-25 NOTE — PROGRESS NOTE ADULT - SUBJECTIVE AND OBJECTIVE BOX
SUBJECTIVE: The patient denies chest pain, shortness of breath, arm pain or jaw pain, dizziness or palpitations.  	  MEDICATIONS:  aliskiren 150 milliGRAM(s) Oral daily  amLODIPine   Tablet 10 milliGRAM(s) Oral daily  metoprolol succinate ER 50 milliGRAM(s) Oral daily  ranolazine 1000 milliGRAM(s) Oral two times a day  atorvastatin 80 milliGRAM(s) Oral at bedtime  dextrose 50% Injectable 12.5 Gram(s) IV Push once  dextrose 50% Injectable 25 Gram(s) IV Push once  dextrose 50% Injectable 25 Gram(s) IV Push once  dextrose Gel 1 Dose(s) Oral once PRN  glucagon  Injectable 1 milliGRAM(s) IntraMuscular once PRN  insulin lispro (HumaLOG) corrective regimen sliding scale   SubCutaneous three times a day before meals  insulin lispro (HumaLOG) corrective regimen sliding scale   SubCutaneous at bedtime    dextrose 5%. 1000 milliLiter(s) IV Continuous <Continuous>      REVIEW OF SYSTEMS:    CONSTITUTIONAL: No fever, weight loss, or fatigue  EYES: No eye pain, visual disturbances, or discharge  NECK: No pain or stiffness  RESPIRATORY: No cough, wheezing, chills or hemoptysis; No Shortness of Breath  CARDIOVASCULAR: No chest pain, palpitations, dizziness, or leg swelling  GASTROINTESTINAL: No abdominal or epigastric pain. No nausea, vomiting, or hematemesis; No diarrhea or constipation. No melena or hematochezia.  GENITOURINARY: No dysuria, frequency, hematuria, or incontinence  NEUROLOGICAL: No headaches, memory loss, loss of strength, numbness, or tremors  SKIN: No itching, burning, rashes, or lesions   LYMPH Nodes: No enlarged glands  MUSCULOSKELETAL: No joint pain or swelling; No muscle, back, or extremity pain  All other review of systems are negative.  	  [ ] Unable to obtain    PHYSICAL EXAM:  T(C): 36.8 (01-25-18 @ 04:50), Max: 36.8 (01-24-18 @ 19:17)  HR: 72 (01-25-18 @ 04:50) (68 - 87)  BP: 140/75 (01-25-18 @ 04:50) (125/89 - 161/87)  RR: 17 (01-25-18 @ 04:50) (15 - 20)  SpO2: 98% (01-25-18 @ 04:50) (97% - 98%)  Wt(kg): --  I&O's Summary    24 Jan 2018 07:01  -  25 Jan 2018 07:00  --------------------------------------------------------  IN: 900 mL / OUT: 300 mL / NET: 600 mL      Height (cm): 185.42 (01-24 @ 08:33)  Weight (kg): 114.3 (01-24 @ 08:33)  BMI (kg/m2): 33.2 (01-24 @ 08:33)  BSA (m2): 2.37 (01-24 @ 08:33)    PHYSICAL EXAM    Appearance: Normal	  HEENT:   Normal oral mucosa, PERRL, EOMI	  NECK: Soft and supple, No LAD, No JVD  Cardiovascular: Regular Rate and Rhythm, Normal S1 S2, No murmurs, No clicks, gallops or rubs  Respiratory: Lungs clear to auscultation	  Gastrointestinal:  Soft, Non-tender, + BS	  Skin: No rashes, No ecchymoses, No cyanosis  Neurologic: Non-focal  Extremities: No clubbing, cyanosis or edema  Vascular: Peripheral pulses palpable 2+ bilaterally    TELEMETRY:  Atrial fibrillation	    	    LABS:	 	                            13.7   8.6   )-----------( 171      ( 25 Jan 2018 00:44 )             37.6     01-25    137  |  96  |  20  ----------------------------<  139<H>  3.7   |  29  |  1.02    Ca    9.3      25 Jan 2018 00:44    TPro  8.8<H>  /  Alb  4.6  /  TBili  1.2  /  DBili  x   /  AST  24  /  ALT  24  /  AlkPhos  101  01-24     HgA1c: Hemoglobin A1C, Whole Blood: 7.9 % (01-24 @ 14:34)

## 2018-01-26 RX ORDER — APIXABAN 2.5 MG/1
1 TABLET, FILM COATED ORAL
Qty: 0 | Refills: 0 | COMMUNITY
Start: 2018-01-26

## 2018-02-08 ENCOUNTER — APPOINTMENT (OUTPATIENT)
Dept: ELECTROPHYSIOLOGY | Facility: CLINIC | Age: 73
End: 2018-02-08

## 2018-04-11 ENCOUNTER — APPOINTMENT (OUTPATIENT)
Dept: GASTROENTEROLOGY | Facility: CLINIC | Age: 73
End: 2018-04-11
Payer: MEDICARE

## 2018-04-11 VITALS — HEIGHT: 73 IN

## 2018-04-11 DIAGNOSIS — R14.0 ABDOMINAL DISTENSION (GASEOUS): ICD-10-CM

## 2018-04-11 DIAGNOSIS — I48.91 UNSPECIFIED ATRIAL FIBRILLATION: ICD-10-CM

## 2018-04-11 PROCEDURE — 99214 OFFICE O/P EST MOD 30 MIN: CPT

## 2018-04-11 RX ORDER — ALISKIREN HEMIFUMARATE AND HYDROCHLOROTHIAZIDE 150; 25 MG/1; MG/1
150-25 TABLET, FILM COATED ORAL
Qty: 90 | Refills: 0 | Status: ACTIVE | COMMUNITY
Start: 2018-04-04

## 2018-04-11 RX ORDER — METOPROLOL SUCCINATE 25 MG/1
25 TABLET, EXTENDED RELEASE ORAL
Qty: 90 | Refills: 0 | Status: ACTIVE | COMMUNITY
Start: 2018-01-13

## 2018-04-11 RX ORDER — METFORMIN HYDROCHLORIDE 500 MG/1
500 TABLET, COATED ORAL
Qty: 60 | Refills: 0 | Status: ACTIVE | COMMUNITY
Start: 2018-02-26

## 2018-04-11 RX ORDER — DIGOXIN 0.25 MG/1
250 TABLET ORAL
Qty: 30 | Refills: 0 | Status: ACTIVE | COMMUNITY
Start: 2017-10-11

## 2018-04-23 ENCOUNTER — APPOINTMENT (OUTPATIENT)
Dept: ELECTROPHYSIOLOGY | Facility: CLINIC | Age: 73
End: 2018-04-23

## 2018-04-25 ENCOUNTER — APPOINTMENT (OUTPATIENT)
Dept: SPINE | Facility: CLINIC | Age: 73
End: 2018-04-25
Payer: MEDICARE

## 2018-04-25 VITALS
HEIGHT: 73 IN | WEIGHT: 252 LBS | DIASTOLIC BLOOD PRESSURE: 70 MMHG | BODY MASS INDEX: 33.4 KG/M2 | SYSTOLIC BLOOD PRESSURE: 150 MMHG

## 2018-04-25 PROCEDURE — 99203 OFFICE O/P NEW LOW 30 MIN: CPT

## 2018-04-25 RX ORDER — METOPROLOL TARTRATE 25 MG/1
25 TABLET, FILM COATED ORAL
Qty: 30 | Refills: 5 | Status: DISCONTINUED | COMMUNITY
Start: 2017-04-03 | End: 2018-04-25

## 2018-06-11 ENCOUNTER — APPOINTMENT (OUTPATIENT)
Dept: GASTROENTEROLOGY | Facility: CLINIC | Age: 73
End: 2018-06-11
Payer: MEDICARE

## 2018-06-11 VITALS
WEIGHT: 252 LBS | SYSTOLIC BLOOD PRESSURE: 139 MMHG | DIASTOLIC BLOOD PRESSURE: 93 MMHG | HEIGHT: 73 IN | RESPIRATION RATE: 16 BRPM | OXYGEN SATURATION: 96 % | HEART RATE: 102 BPM | BODY MASS INDEX: 33.4 KG/M2 | TEMPERATURE: 98.7 F

## 2018-06-11 DIAGNOSIS — M54.5 LOW BACK PAIN: ICD-10-CM

## 2018-06-11 DIAGNOSIS — Z86.010 PERSONAL HISTORY OF COLONIC POLYPS: ICD-10-CM

## 2018-06-11 DIAGNOSIS — K57.90 DIVERTICULOSIS OF INTESTINE, PART UNSPECIFIED, W/OUT PERFORATION OR ABSCESS W/OUT BLEEDING: ICD-10-CM

## 2018-06-11 DIAGNOSIS — K59.00 CONSTIPATION, UNSPECIFIED: ICD-10-CM

## 2018-06-11 DIAGNOSIS — R10.31 RIGHT LOWER QUADRANT PAIN: ICD-10-CM

## 2018-06-11 DIAGNOSIS — Z86.39 PERSONAL HISTORY OF OTHER ENDOCRINE, NUTRITIONAL AND METABOLIC DISEASE: ICD-10-CM

## 2018-06-11 PROCEDURE — 99214 OFFICE O/P EST MOD 30 MIN: CPT

## 2018-06-18 PROBLEM — M54.5 LOWER BACK PAIN: Status: ACTIVE | Noted: 2018-06-18

## 2018-06-18 PROBLEM — R10.31 RLQ DISCOMFORT: Status: ACTIVE | Noted: 2018-04-11

## 2018-06-18 PROBLEM — K59.00 CONSTIPATION: Status: ACTIVE | Noted: 2018-04-11

## 2018-06-18 PROBLEM — K57.90 DIVERTICULOSIS: Status: RESOLVED | Noted: 2017-04-03 | Resolved: 2018-06-18

## 2018-06-18 PROBLEM — Z86.39 HISTORY OF OBESITY: Status: RESOLVED | Noted: 2017-04-03 | Resolved: 2018-06-18

## 2018-06-18 PROBLEM — Z86.010 HISTORY OF COLON POLYPS: Status: RESOLVED | Noted: 2017-04-03 | Resolved: 2018-06-18

## 2018-06-18 RX ORDER — SIMETHICONE 250 MG/1
250 CAPSULE, GELATIN COATED ORAL
Qty: 90 | Refills: 3 | Status: ACTIVE | COMMUNITY
Start: 2018-04-11

## 2018-06-18 RX ORDER — LINACLOTIDE 145 UG/1
145 CAPSULE, GELATIN COATED ORAL DAILY
Qty: 30 | Refills: 3 | Status: ACTIVE | COMMUNITY
Start: 2018-04-11

## 2018-07-27 PROBLEM — Z78.9 ALCOHOL USE: Status: ACTIVE | Noted: 2018-01-16

## 2018-09-26 ENCOUNTER — APPOINTMENT (OUTPATIENT)
Dept: GASTROENTEROLOGY | Facility: HOSPITAL | Age: 73
End: 2018-09-26

## 2018-10-25 ENCOUNTER — EMERGENCY (EMERGENCY)
Facility: HOSPITAL | Age: 73
LOS: 1 days | Discharge: HOME | End: 2018-10-25
Attending: EMERGENCY MEDICINE | Admitting: GENERAL ACUTE CARE HOSPITAL
Payer: MEDICARE

## 2018-10-25 VITALS
RESPIRATION RATE: 18 BRPM | HEART RATE: 122 BPM | OXYGEN SATURATION: 98 % | WEIGHT: 259.93 LBS | DIASTOLIC BLOOD PRESSURE: 122 MMHG | HEIGHT: 73 IN | TEMPERATURE: 98 F | SYSTOLIC BLOOD PRESSURE: 210 MMHG

## 2018-10-25 DIAGNOSIS — Z90.49 ACQUIRED ABSENCE OF OTHER SPECIFIED PARTS OF DIGESTIVE TRACT: Chronic | ICD-10-CM

## 2018-10-25 DIAGNOSIS — I48.91 UNSPECIFIED ATRIAL FIBRILLATION: ICD-10-CM

## 2018-10-25 DIAGNOSIS — Z95.0 PRESENCE OF CARDIAC PACEMAKER: Chronic | ICD-10-CM

## 2018-10-25 PROBLEM — E78.5 HYPERLIPIDEMIA, UNSPECIFIED: Chronic | Status: ACTIVE | Noted: 2017-07-07

## 2018-10-25 LAB
ALBUMIN SERPL ELPH-MCNC: 5 G/DL — SIGNIFICANT CHANGE UP (ref 3.3–5)
ALP SERPL-CCNC: 110 U/L — SIGNIFICANT CHANGE UP (ref 40–120)
ALT FLD-CCNC: 70 U/L — HIGH (ref 10–45)
ANION GAP SERPL CALC-SCNC: 18 MMOL/L — HIGH (ref 5–17)
AST SERPL-CCNC: 48 U/L — HIGH (ref 10–40)
BASOPHILS # BLD AUTO: 0 K/UL — SIGNIFICANT CHANGE UP (ref 0–0.2)
BASOPHILS NFR BLD AUTO: 0.6 % — SIGNIFICANT CHANGE UP (ref 0–2)
BILIRUB SERPL-MCNC: 1 MG/DL — SIGNIFICANT CHANGE UP (ref 0.2–1.2)
BUN SERPL-MCNC: 14 MG/DL — SIGNIFICANT CHANGE UP (ref 7–23)
CALCIUM SERPL-MCNC: 9.8 MG/DL — SIGNIFICANT CHANGE UP (ref 8.4–10.5)
CHLORIDE SERPL-SCNC: 98 MMOL/L — SIGNIFICANT CHANGE UP (ref 96–108)
CK SERPL-CCNC: 87 U/L — SIGNIFICANT CHANGE UP (ref 30–200)
CO2 SERPL-SCNC: 25 MMOL/L — SIGNIFICANT CHANGE UP (ref 22–31)
CREAT SERPL-MCNC: 0.89 MG/DL — SIGNIFICANT CHANGE UP (ref 0.5–1.3)
EOSINOPHIL # BLD AUTO: 0.1 K/UL — SIGNIFICANT CHANGE UP (ref 0–0.5)
EOSINOPHIL NFR BLD AUTO: 0.9 % — SIGNIFICANT CHANGE UP (ref 0–6)
GAS PNL BLDV: SIGNIFICANT CHANGE UP
GLUCOSE BLDC GLUCOMTR-MCNC: 128 MG/DL — HIGH (ref 70–99)
GLUCOSE SERPL-MCNC: 100 MG/DL — HIGH (ref 70–99)
HCT VFR BLD CALC: 42.4 % — SIGNIFICANT CHANGE UP (ref 39–50)
HGB BLD-MCNC: 15.2 G/DL — SIGNIFICANT CHANGE UP (ref 13–17)
LYMPHOCYTES # BLD AUTO: 1.5 K/UL — SIGNIFICANT CHANGE UP (ref 1–3.3)
LYMPHOCYTES # BLD AUTO: 18.7 % — SIGNIFICANT CHANGE UP (ref 13–44)
MCHC RBC-ENTMCNC: 34.3 PG — HIGH (ref 27–34)
MCHC RBC-ENTMCNC: 36 GM/DL — SIGNIFICANT CHANGE UP (ref 32–36)
MCV RBC AUTO: 95.3 FL — SIGNIFICANT CHANGE UP (ref 80–100)
MONOCYTES # BLD AUTO: 0.7 K/UL — SIGNIFICANT CHANGE UP (ref 0–0.9)
MONOCYTES NFR BLD AUTO: 8.6 % — SIGNIFICANT CHANGE UP (ref 2–14)
NEUTROPHILS # BLD AUTO: 5.7 K/UL — SIGNIFICANT CHANGE UP (ref 1.8–7.4)
NEUTROPHILS NFR BLD AUTO: 71.2 % — SIGNIFICANT CHANGE UP (ref 43–77)
PLATELET # BLD AUTO: SIGNIFICANT CHANGE UP K/UL (ref 150–400)
POTASSIUM SERPL-MCNC: 3.4 MMOL/L — LOW (ref 3.5–5.3)
POTASSIUM SERPL-SCNC: 3.4 MMOL/L — LOW (ref 3.5–5.3)
PROT SERPL-MCNC: 9 G/DL — HIGH (ref 6–8.3)
RBC # BLD: 4.45 M/UL — SIGNIFICANT CHANGE UP (ref 4.2–5.8)
RBC # FLD: 12 % — SIGNIFICANT CHANGE UP (ref 10.3–14.5)
SODIUM SERPL-SCNC: 141 MMOL/L — SIGNIFICANT CHANGE UP (ref 135–145)
TROPONIN T, HIGH SENSITIVITY RESULT: 10 NG/L — SIGNIFICANT CHANGE UP (ref 0–51)
TROPONIN T, HIGH SENSITIVITY RESULT: 9 NG/L — SIGNIFICANT CHANGE UP (ref 0–51)
WBC # BLD: 9 K/UL — SIGNIFICANT CHANGE UP (ref 3.8–10.5)
WBC # FLD AUTO: 9 K/UL — SIGNIFICANT CHANGE UP (ref 3.8–10.5)

## 2018-10-25 RX ORDER — DEXTROSE 50 % IN WATER 50 %
15 SYRINGE (ML) INTRAVENOUS ONCE
Qty: 0 | Refills: 0 | Status: DISCONTINUED | OUTPATIENT
Start: 2018-10-25 | End: 2018-10-26

## 2018-10-25 RX ORDER — RANOLAZINE 500 MG/1
1 TABLET, FILM COATED, EXTENDED RELEASE ORAL
Qty: 0 | Refills: 0 | COMMUNITY

## 2018-10-25 RX ORDER — ALISKIREN HEMIFUMARATE 300 MG/1
150 TABLET, FILM COATED ORAL DAILY
Qty: 0 | Refills: 0 | Status: DISCONTINUED | OUTPATIENT
Start: 2018-10-25 | End: 2018-10-26

## 2018-10-25 RX ORDER — DEXTROSE 50 % IN WATER 50 %
25 SYRINGE (ML) INTRAVENOUS ONCE
Qty: 0 | Refills: 0 | Status: DISCONTINUED | OUTPATIENT
Start: 2018-10-25 | End: 2018-10-26

## 2018-10-25 RX ORDER — HYDROCHLOROTHIAZIDE 25 MG
25 TABLET ORAL DAILY
Qty: 0 | Refills: 0 | Status: DISCONTINUED | OUTPATIENT
Start: 2018-10-25 | End: 2018-10-26

## 2018-10-25 RX ORDER — RANOLAZINE 500 MG/1
1000 TABLET, FILM COATED, EXTENDED RELEASE ORAL
Qty: 0 | Refills: 0 | Status: DISCONTINUED | OUTPATIENT
Start: 2018-10-25 | End: 2018-10-26

## 2018-10-25 RX ORDER — METOPROLOL TARTRATE 50 MG
50 TABLET ORAL DAILY
Qty: 0 | Refills: 0 | Status: DISCONTINUED | OUTPATIENT
Start: 2018-10-25 | End: 2018-10-25

## 2018-10-25 RX ORDER — METOPROLOL TARTRATE 50 MG
0.5 TABLET ORAL
Qty: 0 | Refills: 0 | COMMUNITY

## 2018-10-25 RX ORDER — ALISKIREN HEMIFUMARATE 300 MG/1
1 TABLET, FILM COATED ORAL
Qty: 0 | Refills: 0 | COMMUNITY

## 2018-10-25 RX ORDER — POTASSIUM CHLORIDE 20 MEQ
20 PACKET (EA) ORAL ONCE
Qty: 0 | Refills: 0 | Status: COMPLETED | OUTPATIENT
Start: 2018-10-25 | End: 2018-10-25

## 2018-10-25 RX ORDER — GLUCAGON INJECTION, SOLUTION 0.5 MG/.1ML
1 INJECTION, SOLUTION SUBCUTANEOUS ONCE
Qty: 0 | Refills: 0 | Status: DISCONTINUED | OUTPATIENT
Start: 2018-10-25 | End: 2018-10-26

## 2018-10-25 RX ORDER — METOPROLOL TARTRATE 50 MG
50 TABLET ORAL DAILY
Qty: 0 | Refills: 0 | Status: DISCONTINUED | OUTPATIENT
Start: 2018-10-25 | End: 2018-10-26

## 2018-10-25 RX ORDER — SODIUM CHLORIDE 9 MG/ML
500 INJECTION INTRAMUSCULAR; INTRAVENOUS; SUBCUTANEOUS ONCE
Qty: 0 | Refills: 0 | Status: COMPLETED | OUTPATIENT
Start: 2018-10-25 | End: 2018-10-25

## 2018-10-25 RX ORDER — INSULIN LISPRO 100/ML
VIAL (ML) SUBCUTANEOUS AT BEDTIME
Qty: 0 | Refills: 0 | Status: DISCONTINUED | OUTPATIENT
Start: 2018-10-25 | End: 2018-10-26

## 2018-10-25 RX ORDER — APIXABAN 2.5 MG/1
5 TABLET, FILM COATED ORAL EVERY 12 HOURS
Qty: 0 | Refills: 0 | Status: DISCONTINUED | OUTPATIENT
Start: 2018-10-25 | End: 2018-10-26

## 2018-10-25 RX ORDER — INFLUENZA VIRUS VACCINE 15; 15; 15; 15 UG/.5ML; UG/.5ML; UG/.5ML; UG/.5ML
0.5 SUSPENSION INTRAMUSCULAR ONCE
Qty: 0 | Refills: 0 | Status: DISCONTINUED | OUTPATIENT
Start: 2018-10-25 | End: 2018-10-26

## 2018-10-25 RX ORDER — INSULIN LISPRO 100/ML
VIAL (ML) SUBCUTANEOUS
Qty: 0 | Refills: 0 | Status: DISCONTINUED | OUTPATIENT
Start: 2018-10-25 | End: 2018-10-26

## 2018-10-25 RX ORDER — SODIUM CHLORIDE 9 MG/ML
1000 INJECTION, SOLUTION INTRAVENOUS
Qty: 0 | Refills: 0 | Status: DISCONTINUED | OUTPATIENT
Start: 2018-10-25 | End: 2018-10-26

## 2018-10-25 RX ORDER — DEXTROSE 50 % IN WATER 50 %
12.5 SYRINGE (ML) INTRAVENOUS ONCE
Qty: 0 | Refills: 0 | Status: DISCONTINUED | OUTPATIENT
Start: 2018-10-25 | End: 2018-10-26

## 2018-10-25 RX ORDER — AMLODIPINE BESYLATE 2.5 MG/1
10 TABLET ORAL DAILY
Qty: 0 | Refills: 0 | Status: DISCONTINUED | OUTPATIENT
Start: 2018-10-25 | End: 2018-10-26

## 2018-10-25 RX ORDER — EZETIMIBE 10 MG/1
1 TABLET ORAL
Qty: 0 | Refills: 0 | COMMUNITY

## 2018-10-25 RX ADMIN — SODIUM CHLORIDE 1000 MILLILITER(S): 9 INJECTION INTRAMUSCULAR; INTRAVENOUS; SUBCUTANEOUS at 15:30

## 2018-10-25 RX ADMIN — APIXABAN 5 MILLIGRAM(S): 2.5 TABLET, FILM COATED ORAL at 23:18

## 2018-10-25 RX ADMIN — Medication 20 MILLIEQUIVALENT(S): at 17:07

## 2018-10-25 RX ADMIN — RANOLAZINE 1000 MILLIGRAM(S): 500 TABLET, FILM COATED, EXTENDED RELEASE ORAL at 23:18

## 2018-10-25 NOTE — H&P ADULT - PSH
Artificial pacemaker    CAD (coronary artery disease)  s/p CABG - 3 vessel (2003)  History of cholecystectomy

## 2018-10-25 NOTE — ED PROVIDER NOTE - MUSCULOSKELETAL, MLM
Strength appropriate for age. Full active range of motion of all 4 extremities. No calf swelling or edema.

## 2018-10-25 NOTE — ED PROVIDER NOTE - MEDICAL DECISION MAKING DETAILS
Mc Stuart (Resident): 71 y/o rapid a fib, BP WNL - patient didn't take betablocker this afternoon - spoke with yariel dickens who wanted patient to get cardizem and admitted w/ ppm interrogated and obs for rapid a fib - will trial IV diltiazem and reassess response

## 2018-10-25 NOTE — PROCEDURE NOTE - ADDITIONAL PROCEDURE DETAILS
Pt has a micra PPM that is functioning normally. the Pacemaker does not carry any data on when or if a-fib occurred. However, on histograms it is seen that pt spend majority of time w/ HR of 60, however he did go as high as 170. It is unclear how long or when this happening, but overall it happen less then 5% of the time.
none

## 2018-10-25 NOTE — ED ADULT NURSE REASSESSMENT NOTE - NS ED NURSE REASSESS COMMENT FT1
Report received from JOSEPHINE Cast. Cardiology at bedside interrogating pacemaker. Patient awaiting bed assignment.

## 2018-10-25 NOTE — H&P ADULT - ASSESSMENT
72 yr  former smoker,  with PMH of HTN, HLD, DM Type 2 not on meds , CAD/ s/p CABG 2003, Afib ( on Eliquis, ) s/p failed DCCV .. s/p  MICRA pacemaker implant for pauses now sent by Kaushik Johnson for uncontrolled afib/ HTN..  Pt reports that he was doing overall well till two days ago when he had an episode of vertigo /imbalnace as he stood up for sitting position.. this episode was brief without CP/Palpiations/SOB /LOC /Fall...  yesterday he was " not feeling himself " for most of the day and again had another similar episode of near syncope/ vertigo but not as severe..  he did not see his cardiologist until today since Dr. Delgado was not in office yesterday ..  when seen By Dr. Delgado he was found in afib with RVR and sent for further evalaution   Denies fever / HA, Blurry vision / earache /congestion/ N/V / no urinary sx   in ED pt was found to be in HTN urgency / afib with RVR   pt reports that he has not been taking his BB for the past two days and might have skipped it  three -four days ago ( seems not to be compliant )  also of note pt does report that he used to be daily drinker and over the past 15 yrs he has siginificatly cut down to once a week.. last drink was saturday     1- near syncope : sec to afib w/ RVR  and HTN urgency   2- Afib with RVR :  f/u EP .. Known to Dr Garrett   pt dos not seem to be compliant with meds   rate control ( would restart bb ) and if not controlled changed to CCB ?   vs Digoxin   cont AC   PPm interrogation   3- HTN urgency : likley sec to noncompliance ..?  element of ETOH withdawal  however last drink > 3 days ago   monitor on BB   4- hypokqlemia : replete   check Mag   5- DM : check A1c and monitor FS   6- CAD : not on ASA ???  f/u with cardio 72 yr  former smoker,  with PMH of HTN, HLD, DM Type 2 not on meds , CAD/ s/p CABG 2003, Afib ( on Eliquis, ) s/p failed DCCV .. s/p  MICRA pacemaker implant for pauses now sent by Kaushik Johnson for uncontrolled afib/ HTN..  Pt reports that he was doing overall well till two days ago when he had an episode of vertigo /imbalnace as he stood up for sitting position.. this episode was brief without CP/Palpiations/SOB /LOC /Fall...  yesterday he was " not feeling himself " for most of the day and again had another similar episode of near syncope/ vertigo but not as severe..  he did not see his cardiologist until today since Dr. Delgado was not in office yesterday ..  when seen By Dr. Delgado he was found in afib with RVR and sent for further evalaution   Denies fever / HA, Blurry vision / earache /congestion/ N/V / no urinary sx   in ED pt was found to be in HTN urgency / afib with RVR   pt reports that he has not been taking his BB for the past two days and might have skipped it  three -four days ago ( seems not to be compliant )  also of note pt does report that he used to be daily drinker and over the past 15 yrs he has siginificatly cut down to once a week.. last drink was saturday     1- near syncope : sec to afib w/ RVR  and HTN urgency   2- Afib with RVR :  f/u EP .. Known to Dr Garrett   pt dos not seem to be compliant with meds   rate control ( would restart bb ) and if not controlled changed to CCB ?   vs Digoxin   cont AC   PPm interrogation   3- HTN urgency : likley sec to noncompliance ..?  element of ETOH withdawal  however last drink > 3 days ago   monitor on BB   4- hypokqlemia : replete   check Mag   5- DM : check A1c and monitor FS   6- CAD : not on ASA ???  f/u with cardio   7- metabolic acidosis : mild    monitor for now   8- elevated LFT : ? sec to ETOH /anti lipid vs mild hepatic congestion   will repeat and monitor   if rising will anti lipid   consider US

## 2018-10-25 NOTE — ED PROVIDER NOTE - OBJECTIVE STATEMENT
71 y/o male hx of HTN, A fib on Eliquis and Metoprolol, CAD, DM p/w tachycardia. Per patient, felt different and lightheaded since yesterday. Was unable 73 y/o male hx of HTN, A fib on Eliquis and Metoprolol, CAD, DM p/w tachycardia. Per patient, felt different and lightheaded since yesterday. Was unable to see his PMD yesterday because doesn't work on Wednesday, so went into today, found to be in rapid A fib so came to ED. Patient denies ever having any CP, SOB, N/V/D - just lightheaded and did not feel well. Did not take his metoprolol yet this am, takes it in the afternoon. Patient has a PPM.   Cards: Danny

## 2018-10-25 NOTE — ED PROVIDER NOTE - ATTENDING CONTRIBUTION TO CARE
72M pmh htn, a-fib on eliquis, s/p ppm, cad, dm, presents from doctor's office with tachycardia. pt felt some lightheadedness and palpitations beginning yesterday, seen by pmd today and found to be in rapid a-fib. denies cp, sob, cough, congestion, f/c, n/v/d, or any other complaints. Denies calf pain or swelling.    PE: NAD, NCAT, MMM, Trachea midline, Normal conjunctiva, lungs CTAB, S1/S2 tachycardia, irregular, Normal perfusion, 2+ radial pulses bilat, Abdomen Soft, NTND, No rebound/guarding, No LE edema, No deformity of extremities, No rashes,  No focal motor or sensory deficits.    Pt in rapid a-fib on arrival but BP stable, no ischemic changes on ekg. I spoke to pt's cardiologist Danny who recommended giving cardizem, and also recommended admission to Dr. Haney. Pt without cp, low suspicion of acs but check troponin. No sob, no hypoxia, with very low suspicion of PE as precipitant. Check CBC eval for anemia, cmp eval for metabolic derangement. Admit to tele. - Rock Andrew MD

## 2018-10-25 NOTE — CONSULT NOTE ADULT - ASSESSMENT
72M w/ CAD s/p CABG, A-fib w/ pauses s/p MICRA PPM, HTN, and ? heavy EtOH use that was sent in by his out patient provider for a-fib w/ RVR and hypertension.     #A-fib w/ RVR  - Add on CK and CKMB to current lab work.   - C/w apixaban for AC  - Start Metoprolol 25 BID (w/ plan to eventually get pt on ER metoprolol)   - Monitor on tele     Jesse Javier MD  Cardiology Fellow - PGY-4  [facility]: [id number]  NS: [phone]  [id number] 72M w/ CAD s/p CABG, A-fib w/ pauses s/p MICRA PPM, HTN, and ? heavy EtOH use that was sent in by his out patient provider for a-fib w/ RVR and hypertension.     #A-fib w/ RVR  - Add on CK and CKMB to current lab work.   - C/w apixaban for AC  - Start Metoprolol 25 BID (w/ plan to eventually get pt on ER metoprolol)   - Monitor on tele   - Check RVP  - Check orthostatics     Jesse Javier MD  Cardiology Fellow - PGY-4  [facility]: [id number]  NS: [phone]  [id number]

## 2018-10-25 NOTE — H&P ADULT - HISTORY OF PRESENT ILLNESS
72 yr  former smoker,  with PMH of HTN, HLD, DM Type 2 not on meds , CAD/ s/p CABG 2003, Afib ( on Eliquis, ) s/p failed DCCV .. s/p  MICRA pacemaker implant for pauses now sent by Kaushik Johnson for uncontrolled afib/ HTN..  Pt reports that he was doing overall well till two days ago when he had an episode of vertigo /imbalnace as he stood up for sitting position.. this episode was brief without CP/Palpiations/SOB /LOC /Fall...  yesterday he was " not feeling himself " for most of the day and again had another similar episode of near syncope/ vertigo but not as severe..  he did not see his cardiologist until today since Dr. Delgado was not in office yesterday ..  when seen By Dr. Delgado he was found in afib with RVR and sent for further evalaution   Denies fever / HA, Blurry vision / earache /congestion/ N/V / no urinary sx   in ED pt was found to be in HTN urgency / afib with RVR   pt reports that he has not been taking his BB for the past two days and might have skipped it  three -four days ago ( seems not to be compliant )  also of note pt does report that he used to be daily drinker and over the past 15 yrs he has siginificatly cut down to once a week.. last drink was saturday

## 2018-10-25 NOTE — H&P ADULT - NEGATIVE NEUROLOGICAL SYMPTOMS
no focal seizures/no loss of consciousness/no hemiparesis/no paresthesias/no generalized seizures/no facial palsy/no headache/no confusion/no transient paralysis/no weakness/no tremors/no loss of sensation/no syncope

## 2018-10-25 NOTE — ED CLERICAL - NS ED CLERK NOTE PRE-ARRIVAL INFORMATION; ADDITIONAL PRE-ARRIVAL INFORMATION
CC/Reason For referral:  RAPID A-FIB, HYPERTENSION 170/110, PATIENT HAS PERMANENT PACEMAKER  Preferred Consultant(if applicable): QAMAR PEREZ MD  Who admits for you (if needed):  Do you have documents you would like to fax over? NO  Would you still like to speak to an ED attending? PLEASE CALL AFTER PATIENT IS SEEN

## 2018-10-25 NOTE — ED PROVIDER NOTE - CRITICAL CARE PROVIDED
interpretation of diagnostic studies/consultation with other physicians/consult w/ pt's family directly relating to pts condition/additional history taking/direct patient care (not related to procedure)

## 2018-10-25 NOTE — ED ADULT NURSE NOTE - OBJECTIVE STATEMENT
72 year old male presents to the hx of HTN, A fib on Eliquis and Metoprolol, CAD, DM p/w tachycardia. Per patient, felt different and lightheaded since yesterday. Was unable to see his PMD yesterday because doesn't work on Wednesday, so went into today, found to be in rapid A fib so came to ED. Patient denies ever having any CP, SOB, N/V/D - just lightheaded and did not feel well. Did not take his metoprolol yet this am, takes it in the afternoon. Patient has a PPM.

## 2018-10-25 NOTE — ED ADULT NURSE NOTE - NSIMPLEMENTINTERV_GEN_ALL_ED
Implemented All Fall with Harm Risk Interventions:  [place] to call system. Call bell, personal items and telephone within reach. Instruct patient to call for assistance. Room bathroom lighting operational. Non-slip footwear when patient is off stretcher. Physically safe environment: no spills, clutter or unnecessary equipment. Stretcher in lowest position, wheels locked, appropriate side rails in place. Provide visual cue, wrist band, yellow gown, etc. Monitor gait and stability. Monitor for mental status changes and reorient to person, place, and time. Review medications for side effects contributing to fall risk. Reinforce activity limits and safety measures with patient and family. Provide visual clues: red socks.

## 2018-10-26 ENCOUNTER — TRANSCRIPTION ENCOUNTER (OUTPATIENT)
Age: 73
End: 2018-10-26

## 2018-10-26 VITALS
HEART RATE: 83 BPM | DIASTOLIC BLOOD PRESSURE: 84 MMHG | SYSTOLIC BLOOD PRESSURE: 150 MMHG | TEMPERATURE: 83 F | OXYGEN SATURATION: 97 % | RESPIRATION RATE: 18 BRPM

## 2018-10-26 LAB
ALBUMIN SERPL ELPH-MCNC: 4.4 G/DL — SIGNIFICANT CHANGE UP (ref 3.3–5)
ALP SERPL-CCNC: 95 U/L — SIGNIFICANT CHANGE UP (ref 40–120)
ALT FLD-CCNC: 50 U/L — HIGH (ref 10–45)
ANION GAP SERPL CALC-SCNC: 15 MMOL/L — SIGNIFICANT CHANGE UP (ref 5–17)
AST SERPL-CCNC: 30 U/L — SIGNIFICANT CHANGE UP (ref 10–40)
BASOPHILS # BLD AUTO: 0.1 K/UL — SIGNIFICANT CHANGE UP (ref 0–0.2)
BASOPHILS NFR BLD AUTO: 1 % — SIGNIFICANT CHANGE UP (ref 0–2)
BILIRUB SERPL-MCNC: 1.3 MG/DL — HIGH (ref 0.2–1.2)
BUN SERPL-MCNC: 12 MG/DL — SIGNIFICANT CHANGE UP (ref 7–23)
CALCIUM SERPL-MCNC: 9.4 MG/DL — SIGNIFICANT CHANGE UP (ref 8.4–10.5)
CHLORIDE SERPL-SCNC: 102 MMOL/L — SIGNIFICANT CHANGE UP (ref 96–108)
CHOLEST SERPL-MCNC: 134 MG/DL — SIGNIFICANT CHANGE UP (ref 10–199)
CO2 SERPL-SCNC: 29 MMOL/L — SIGNIFICANT CHANGE UP (ref 22–31)
CREAT SERPL-MCNC: 0.9 MG/DL — SIGNIFICANT CHANGE UP (ref 0.5–1.3)
EOSINOPHIL # BLD AUTO: 0.1 K/UL — SIGNIFICANT CHANGE UP (ref 0–0.5)
EOSINOPHIL NFR BLD AUTO: 1.3 % — SIGNIFICANT CHANGE UP (ref 0–6)
GLUCOSE BLDC GLUCOMTR-MCNC: 125 MG/DL — HIGH (ref 70–99)
GLUCOSE BLDC GLUCOMTR-MCNC: 140 MG/DL — HIGH (ref 70–99)
GLUCOSE SERPL-MCNC: 130 MG/DL — HIGH (ref 70–99)
HBA1C BLD-MCNC: 6.3 % — HIGH (ref 4–5.6)
HCT VFR BLD CALC: 39.8 % — SIGNIFICANT CHANGE UP (ref 39–50)
HDLC SERPL-MCNC: 53 MG/DL — SIGNIFICANT CHANGE UP
HGB BLD-MCNC: 13.4 G/DL — SIGNIFICANT CHANGE UP (ref 13–17)
LIPID PNL WITH DIRECT LDL SERPL: 65 MG/DL — SIGNIFICANT CHANGE UP
LYMPHOCYTES # BLD AUTO: 0.8 K/UL — LOW (ref 1–3.3)
LYMPHOCYTES # BLD AUTO: 13.4 % — SIGNIFICANT CHANGE UP (ref 13–44)
MAGNESIUM SERPL-MCNC: 1.9 MG/DL — SIGNIFICANT CHANGE UP (ref 1.6–2.6)
MCHC RBC-ENTMCNC: 32.4 PG — SIGNIFICANT CHANGE UP (ref 27–34)
MCHC RBC-ENTMCNC: 33.6 GM/DL — SIGNIFICANT CHANGE UP (ref 32–36)
MCV RBC AUTO: 96.3 FL — SIGNIFICANT CHANGE UP (ref 80–100)
MONOCYTES # BLD AUTO: 0.6 K/UL — SIGNIFICANT CHANGE UP (ref 0–0.9)
MONOCYTES NFR BLD AUTO: 10.5 % — SIGNIFICANT CHANGE UP (ref 2–14)
NEUTROPHILS # BLD AUTO: 4.4 K/UL — SIGNIFICANT CHANGE UP (ref 1.8–7.4)
NEUTROPHILS NFR BLD AUTO: 73.7 % — SIGNIFICANT CHANGE UP (ref 43–77)
PLATELET # BLD AUTO: 178 K/UL — SIGNIFICANT CHANGE UP (ref 150–400)
POTASSIUM SERPL-MCNC: 3.4 MMOL/L — LOW (ref 3.5–5.3)
POTASSIUM SERPL-SCNC: 3.4 MMOL/L — LOW (ref 3.5–5.3)
PROT SERPL-MCNC: 7.3 G/DL — SIGNIFICANT CHANGE UP (ref 6–8.3)
RAPID RVP RESULT: SIGNIFICANT CHANGE UP
RBC # BLD: 4.13 M/UL — LOW (ref 4.2–5.8)
RBC # FLD: 12.5 % — SIGNIFICANT CHANGE UP (ref 10.3–14.5)
SODIUM SERPL-SCNC: 146 MMOL/L — HIGH (ref 135–145)
T4 FREE SERPL-MCNC: 1.4 NG/DL — SIGNIFICANT CHANGE UP (ref 0.9–1.8)
TOTAL CHOLESTEROL/HDL RATIO MEASUREMENT: 2.5 RATIO — LOW (ref 3.4–9.6)
TRIGL SERPL-MCNC: 80 MG/DL — SIGNIFICANT CHANGE UP (ref 10–149)
TSH SERPL-MCNC: 3.31 UIU/ML — SIGNIFICANT CHANGE UP (ref 0.27–4.2)
WBC # BLD: 5.9 K/UL — SIGNIFICANT CHANGE UP (ref 3.8–10.5)
WBC # FLD AUTO: 5.9 K/UL — SIGNIFICANT CHANGE UP (ref 3.8–10.5)

## 2018-10-26 PROCEDURE — 87798 DETECT AGENT NOS DNA AMP: CPT

## 2018-10-26 PROCEDURE — 84484 ASSAY OF TROPONIN QUANT: CPT

## 2018-10-26 PROCEDURE — 82550 ASSAY OF CK (CPK): CPT

## 2018-10-26 PROCEDURE — 85027 COMPLETE CBC AUTOMATED: CPT

## 2018-10-26 PROCEDURE — 80061 LIPID PANEL: CPT

## 2018-10-26 PROCEDURE — 83036 HEMOGLOBIN GLYCOSYLATED A1C: CPT

## 2018-10-26 PROCEDURE — 71045 X-RAY EXAM CHEST 1 VIEW: CPT

## 2018-10-26 PROCEDURE — 96374 THER/PROPH/DIAG INJ IV PUSH: CPT

## 2018-10-26 PROCEDURE — 82962 GLUCOSE BLOOD TEST: CPT

## 2018-10-26 PROCEDURE — 83735 ASSAY OF MAGNESIUM: CPT

## 2018-10-26 PROCEDURE — 84443 ASSAY THYROID STIM HORMONE: CPT

## 2018-10-26 PROCEDURE — 93005 ELECTROCARDIOGRAM TRACING: CPT

## 2018-10-26 PROCEDURE — 87486 CHLMYD PNEUM DNA AMP PROBE: CPT

## 2018-10-26 PROCEDURE — 87633 RESP VIRUS 12-25 TARGETS: CPT

## 2018-10-26 PROCEDURE — 84439 ASSAY OF FREE THYROXINE: CPT

## 2018-10-26 PROCEDURE — 80053 COMPREHEN METABOLIC PANEL: CPT

## 2018-10-26 PROCEDURE — 87581 M.PNEUMON DNA AMP PROBE: CPT

## 2018-10-26 PROCEDURE — 99291 CRITICAL CARE FIRST HOUR: CPT | Mod: 25

## 2018-10-26 RX ORDER — APIXABAN 2.5 MG/1
1 TABLET, FILM COATED ORAL
Qty: 0 | Refills: 0 | COMMUNITY
Start: 2018-10-26

## 2018-10-26 RX ORDER — POTASSIUM CHLORIDE 20 MEQ
40 PACKET (EA) ORAL EVERY 4 HOURS
Qty: 0 | Refills: 0 | Status: DISCONTINUED | OUTPATIENT
Start: 2018-10-26 | End: 2018-10-26

## 2018-10-26 RX ADMIN — Medication 25 MILLIGRAM(S): at 05:56

## 2018-10-26 RX ADMIN — Medication 40 MILLIEQUIVALENT(S): at 09:52

## 2018-10-26 RX ADMIN — RANOLAZINE 1000 MILLIGRAM(S): 500 TABLET, FILM COATED, EXTENDED RELEASE ORAL at 05:55

## 2018-10-26 RX ADMIN — APIXABAN 5 MILLIGRAM(S): 2.5 TABLET, FILM COATED ORAL at 05:56

## 2018-10-26 RX ADMIN — ALISKIREN HEMIFUMARATE 150 MILLIGRAM(S): 300 TABLET, FILM COATED ORAL at 05:56

## 2018-10-26 RX ADMIN — AMLODIPINE BESYLATE 10 MILLIGRAM(S): 2.5 TABLET ORAL at 05:56

## 2018-10-26 RX ADMIN — Medication 50 MILLIGRAM(S): at 05:56

## 2018-10-26 NOTE — DISCHARGE NOTE ADULT - SECONDARY DIAGNOSIS.
Near syncope Hypertensive urgency Hypokalemia Type 2 diabetes mellitus with complication, without long-term current use of insulin Coronary artery disease involving native coronary artery of native heart without angina pectoris CAD (coronary artery disease)

## 2018-10-26 NOTE — DISCHARGE NOTE ADULT - HOSPITAL COURSE
72 yr old male former smoker, with PMH of HTN, HLD, DM Type 2 not on meds , CAD/ s/p CABG 2003, Afib ( on Eliquis, ) s/p failed DCCV - s/p  MICRA pacemaker implant for pauses now sent by cardiologist for uncontrolled afib/ HTN likely secondary to noncompliance. Patient does not seem to be compliant with medications and has missed doses recently. Pt also with episodes of near syncope likely sec to afib w/ RVR  and HTN urgency. Orthostatics Neg. Troponin neg x 2. Pt seen by EP – micra PPM interrogated – functioning normally. The Pacemaker does not carry any data on when or if a-fib occurred. However, on histograms it is seen that pt spend majority of time w/ HR of 60, however he did go as high as 170. It is unclear how long or when this happening, but overall it happen less then 5% of the time. Pt also found to have hypokalemia – replenished. Pt with elevated LFT possibly sec to ETOH vs mild hepatic congestion –LFTs downtrended on own to normal AST and borderline normal ALT. Patient HR and BP remained controlled on home medications. Patient seen by cardiology – stable for d/c home. Patient medically cleared for discharge home by Dr. Haney with outpatient PCP/Cardiology follow up.

## 2018-10-26 NOTE — DISCHARGE NOTE ADULT - PLAN OF CARE
Resolved - Heart Rate Remain Controlled Atrial fibrillation is the most common heart rhythm problem.  The condition puts you at risk for has stroke and heart attack  It helps if you control your blood pressure, not drink more than 1-2 alcohol drinks per day, cut down on caffeine, getting treatment for over active thyroid gland, and get regular exercise  Call your doctor if you feel your heart racing or beating unusually, chest tightness or pain, lightheaded, faint, shortness of breath especially with exercise  It is important to take your heart medication as prescribed  You may be on anticoagulation which is very important to take as directed - you may need blood work to monitor drug levels Prevent further episodes HOME CARE INSTRUCTIONS  Have someone stay with you until you feel stable.  Do not drive, operate machinery, or play sports until your caregiver says it is okay.  Keep all follow-up appointments as directed by your caregiver.   Lie down right away if you start feeling like you might faint. Breathe deeply and steadily. Wait until all the symptoms have passed. Drink enough fluids to keep your urine clear or pale yellow.  If you are taking blood pressure or heart medicine, get up slowly, taking several minutes to sit and then stand. This can reduce dizziness.  SEEK IMMEDIATE MEDICAL CARE IF:  You have a severe headache.  You have unusual pain in the chest, abdomen, or back.  You are bleeding from the mouth or rectum, or you have black or tarry stool.  You have an irregular or very fast heartbeat.  You have pain with breathing.  You have repeated fainting or seizure-like jerking during an episode.  You faint when sitting or lying down.  You have confusion.  You have difficulty walking.  You have severe weakness.  You have vision problems.  If you fainted, call your local emergency services. Do not drive yourself to the hospital Resolved - Prevent by taking medications as prescribed Take medications for your blood pressure as recommended.  Eat a heart healthy diet that is low in saturated fats and salt, and includes whole grains, fruits, vegetables and lean protein   Exercise regularly (consult with your physician or cardiologist first); maintain a heart healthy weight.   If you smoke - quit (A resource to help you stop smoking is the [facility] – phone number [phone].). Continue to follow with your primary physician or cardiologist.   Seek medical help for dizziness, Lightheadedness, Blurry vision, Headache, Chest pain, Shortness of breath  Follow up with your medical doctor to establish long term blood pressure treatment goals. Controlled HgA1C this admission - 6.3  Make sure you get your HgA1c checked every three months.  If you take oral diabetes medications, check your blood glucose two times a day.  If you take insulin, check your blood glucose before meals and at bedtime.  It's important not to skip any meals.  Keep a log of your blood glucose results and always take it with you to your doctor appointments.  Keep a list of your current medications including injectables and over the counter medications and bring this medication list with you to all your doctor appointments.  If you have not seen your ophthalmologist this year call for appointment.  Check your feet daily for redness, sores, or openings. Do not self treat. If no improvement in two days call your primary care physician for an appointment.  Low blood sugar (hypoglycemia) is a blood sugar below 70mg/dl. Check your blood sugar if you feel signs/symptoms of hypoglycemia. If your blood sugar is below 70 take 15 grams of carbohydrates (ex 4 oz of apple juice, 3-4 glucose tablets, or 4-6 oz of regular soda) wait 15 minutes and repeat blood sugar to make sure it comes up above 70.  If your blood sugar is above 70 and you are due for a meal, have a meal.  If you are not due for a meal have a snack.  This snack helps keeps your blood sugar at a safe range. Coronary artery disease is a condition where the arteries the supply the heart muscle get clogged with fatty deposits & puts you at risk for a heart attack  Call your doctor if you have any new pain, pressure, or discomfort in the center of your chest, pain, tingling or discomfort in arms, back, neck, jaw, or stomach, shortness of breath, nausea, vomiting, burping or heartburn, sweating, cold and clammy skin, racing or abnormal heartbeat for more than 10 minutes or if they keep coming & going.  Call 911 and do not tr to get to hospital by care  You can help yourself with lifestyle changes (quitting smoking if you smoke), eat lots of fruits & vegetables & low fat dairy products, not a lot of meat & fatty foods, walk or some form of physical activity most days of the week, lose weight if you are overweight  Take your cardiac medication as prescribed to lower cholesterol, to lower blood pressure, aspirin to prevent blood clots, and diabetes control  Make sure to keep appointments with doctor for cardiac follow up care This was likely due to your rapid heart rate and uncontrolled blood pressure - it is very important to take your medications as prescribed  HOME CARE INSTRUCTIONS  Have someone stay with you until you feel stable.  Do not drive, operate machinery, or play sports until your caregiver says it is okay.  Keep all follow-up appointments as directed by your caregiver.   Lie down right away if you start feeling like you might faint. Breathe deeply and steadily. Wait until all the symptoms have passed. Drink enough fluids to keep your urine clear or pale yellow.  If you are taking blood pressure or heart medicine, get up slowly, taking several minutes to sit and then stand. This can reduce dizziness.  SEEK IMMEDIATE MEDICAL CARE IF:  You have a severe headache.  You have unusual pain in the chest, abdomen, or back.  You are bleeding from the mouth or rectum, or you have black or tarry stool.  You have an irregular or very fast heartbeat.  You have pain with breathing.  You have repeated fainting or seizure-like jerking during an episode.  You faint when sitting or lying down.  You have confusion.  You have difficulty walking.  You have severe weakness.  You have vision problems.  If you fainted, call your local emergency services. Do not drive yourself to the hospital Supplement - Monitor as outpatient You received potassium supplements  Incorporate more potassium rich foods in your diet such as bananas  Follow up with PCP for a repeat potassium level next week - your last potassium level was 3.4 before you received supplement on 10/26 Remain chest pain free

## 2018-10-26 NOTE — PROGRESS NOTE ADULT - SUBJECTIVE AND OBJECTIVE BOX
No events overnight.    MEDICATIONS:  aliskiren 150 milliGRAM(s) Oral daily  amLODIPine   Tablet 10 milliGRAM(s) Oral daily  apixaban 5 milliGRAM(s) Oral every 12 hours  hydrochlorothiazide 25 milliGRAM(s) Oral daily  metoprolol succinate ER 50 milliGRAM(s) Oral daily  ranolazine 1000 milliGRAM(s) Oral two times a day            dextrose 40% Gel 15 Gram(s) Oral once PRN  dextrose 50% Injectable 12.5 Gram(s) IV Push once  dextrose 50% Injectable 25 Gram(s) IV Push once  dextrose 50% Injectable 25 Gram(s) IV Push once  glucagon  Injectable 1 milliGRAM(s) IntraMuscular once PRN  insulin lispro (HumaLOG) corrective regimen sliding scale   SubCutaneous three times a day before meals  insulin lispro (HumaLOG) corrective regimen sliding scale   SubCutaneous at bedtime    dextrose 5%. 1000 milliLiter(s) IV Continuous <Continuous>  influenza   Vaccine 0.5 milliLiter(s) IntraMuscular once  potassium chloride    Tablet ER 40 milliEquivalent(s) Oral every 4 hours      REVIEW OF SYSTEMS:  No CP SOB palps    PHYSICAL EXAM:  T(C): 28.3 (10-26-18 @ 08:58), Max: 36.9 (10-26-18 @ 01:07)  HR: 83 (10-26-18 @ 08:58) (75 - 122)  BP: 150/84 (10-26-18 @ 08:58) (146/88 - 210/122)  RR: 18 (10-26-18 @ 08:58) (18 - 19)  SpO2: 97% (10-26-18 @ 08:58) (96% - 99%)  Wt(kg): --  I&O's Summary    25 Oct 2018 07:01  -  26 Oct 2018 07:00  --------------------------------------------------------  IN: 360 mL / OUT: 1150 mL / NET: -790 mL    26 Oct 2018 07:01  -  26 Oct 2018 13:12  --------------------------------------------------------  IN: 120 mL / OUT: 550 mL / NET: -430 mL        Appearance: Normal	  HEENT:   Normal oral mucosa  Cardiovascular: Normal S1 S2, No JVD, No murmurs, No edema  Respiratory: Lungs clear to auscultation	  Psychiatry: A & O x 3, Mood & affect appropriate  Gastrointestinal:  Soft, Non-tender, + BS, obese  Skin: No rashes, No ecchymoses, No cyanosis	  Neurologic: Non-focal  Extremities: Normal range of motion, No clubbing, cyanosis or edema  Vascular: Peripheral pulses palpable 2+ bilaterally        LABS:	 	    CBC Full  -  ( 26 Oct 2018 07:03 )  WBC Count : 5.9 K/uL  Hemoglobin : 13.4 g/dL  Hematocrit : 39.8 %  Platelet Count - Automated : 178 K/uL  Mean Cell Volume : 96.3 fl  Mean Cell Hemoglobin : 32.4 pg  Mean Cell Hemoglobin Concentration : 33.6 gm/dL  Auto Neutrophil # : 4.4 K/uL  Auto Lymphocyte # : 0.8 K/uL  Auto Monocyte # : 0.6 K/uL  Auto Eosinophil # : 0.1 K/uL  Auto Basophil # : 0.1 K/uL  Auto Neutrophil % : 73.7 %  Auto Lymphocyte % : 13.4 %  Auto Monocyte % : 10.5 %  Auto Eosinophil % : 1.3 %  Auto Basophil % : 1.0 %    10-26    146<H>  |  102  |  12  ----------------------------<  130<H>  3.4<L>   |  29  |  0.90  10-25    141  |  98  |  14  ----------------------------<  100<H>  3.4<L>   |  25  |  0.89    Ca    9.4      26 Oct 2018 07:02  Ca    9.8      25 Oct 2018 15:41  Mg     1.9     10-26    TPro  7.3  /  Alb  4.4  /  TBili  1.3<H>  /  DBili  x   /  AST  30  /  ALT  50<H>  /  AlkPhos  95  10-26  TPro  9.0<H>  /  Alb  5.0  /  TBili  1.0  /  DBili  x   /  AST  48<H>  /  ALT  70<H>  /  AlkPhos  110  10-25      proBNP:   Lipid Profile:   HgA1c: Hemoglobin A1C, Whole Blood: 6.3 % (10-26-18 @ 10:47)    TSH: Thyroid Stimulating Hormone, Serum: 3.31 uIU/mL (10-26-18 @ 09:24)      TELEMETRY: 	  afib 80s  	  ASSESSMENT/PLAN: 	  72M w/ CAD s/p CABG, A-fib w/ pauses s/p MICRA PPM, HTN, and ? heavy EtOH use that was sent in by his out patient provider for a-fib w/ RVR and hypertension.     #A-fib w/ RVR  - now rate controlled with starting home meds -  metop succ 50mg daily  - C/w apixaban for AC  - no further studies planned      Vianca Burns MD  Cardiology Fellow

## 2018-10-26 NOTE — PROGRESS NOTE ADULT - SUBJECTIVE AND OBJECTIVE BOX
Patient is a 72y old  Male who presents with a chief complaint of AF (26 Oct 2018 11:48)                                                               INTERVAL HPI/OVERNIGHT EVENTS:    REVIEW OF SYSTEMS:     CONSTITUTIONAL: No weakness, fevers or chills  RESPIRATORY: No cough, wheezing,  No shortness of breath  CARDIOVASCULAR: No chest pain or palpitations  GASTROINTESTINAL: No abdominal pain  . No nausea, vomiting, or hematemesis; No diarrhea or constipation. No melena or hematochezia.  GENITOURINARY: No dysuria, frequency or hematuria  NEUROLOGICAL: No numbness or weakness                                                                                                                                                                                                                                                                                  Medications:  MEDICATIONS  (STANDING):  aliskiren 150 milliGRAM(s) Oral daily  amLODIPine   Tablet 10 milliGRAM(s) Oral daily  apixaban 5 milliGRAM(s) Oral every 12 hours  dextrose 5%. 1000 milliLiter(s) (50 mL/Hr) IV Continuous <Continuous>  dextrose 50% Injectable 12.5 Gram(s) IV Push once  dextrose 50% Injectable 25 Gram(s) IV Push once  dextrose 50% Injectable 25 Gram(s) IV Push once  hydrochlorothiazide 25 milliGRAM(s) Oral daily  influenza   Vaccine 0.5 milliLiter(s) IntraMuscular once  insulin lispro (HumaLOG) corrective regimen sliding scale   SubCutaneous three times a day before meals  insulin lispro (HumaLOG) corrective regimen sliding scale   SubCutaneous at bedtime  metoprolol succinate ER 50 milliGRAM(s) Oral daily  potassium chloride    Tablet ER 40 milliEquivalent(s) Oral every 4 hours  ranolazine 1000 milliGRAM(s) Oral two times a day    MEDICATIONS  (PRN):  dextrose 40% Gel 15 Gram(s) Oral once PRN Blood Glucose LESS THAN 70 milliGRAM(s)/deciliter  glucagon  Injectable 1 milliGRAM(s) IntraMuscular once PRN Glucose LESS THAN 70 milligrams/deciliter       Allergies    No Known Allergies    Intolerances      Vital Signs Last 24 Hrs  T(C): 28.3 (26 Oct 2018 08:58), Max: 36.9 (26 Oct 2018 01:07)  T(F): 83 (26 Oct 2018 08:58), Max: 98.4 (26 Oct 2018 01:07)  HR: 83 (26 Oct 2018 08:58) (75 - 122)  BP: 150/84 (26 Oct 2018 08:58) (146/88 - 210/122)  BP(mean): --  RR: 18 (26 Oct 2018 08:58) (18 - 19)  SpO2: 97% (26 Oct 2018 08:58) (96% - 99%)  CAPILLARY BLOOD GLUCOSE      POCT Blood Glucose.: 125 mg/dL (26 Oct 2018 12:53)  POCT Blood Glucose.: 140 mg/dL (26 Oct 2018 08:20)  POCT Blood Glucose.: 128 mg/dL (25 Oct 2018 22:00)      10-25 @ 07:01  -  10-26 @ 07:00  --------------------------------------------------------  IN: 360 mL / OUT: 1150 mL / NET: -790 mL    10-26 @ 07:01  -  10-26 @ 13:50  --------------------------------------------------------  IN: 120 mL / OUT: 550 mL / NET: -430 mL      Physical Exam:    Daily Height in cm: 185.42 (25 Oct 2018 15:00)    General: NAD   HEENT:  Nonicteric, PERRLA  CV:  irreg irreg S1S2   Lungs:  CTA B/L, no wheezes, rales, rhonchi  Abdomen:  Soft, non-tender, no distended, positive BS  Extremities: no  edema  Skin:  Warm and dry, no rashes  :  No garcia  Neuro:  AAOx3, non-focal, grossly intact                                                                                                                                                                                                                                                                                                LABS:                               13.4   5.9   )-----------( 178      ( 26 Oct 2018 07:03 )             39.8                      10-26    146<H>  |  102  |  12  ----------------------------<  130<H>  3.4<L>   |  29  |  0.90    Ca    9.4      26 Oct 2018 07:02  Mg     1.9     10-26    TPro  7.3  /  Alb  4.4  /  TBili  1.3<H>  /  DBili  x   /  AST  30  /  ALT  50<H>  /  AlkPhos  95  10-26                       RADIOLOGY & ADDITIONAL TESTS         I personally reviewed: [  ]EKG   [  ]CXR    [  ] CT

## 2018-10-26 NOTE — DISCHARGE NOTE ADULT - CARE PROVIDER_API CALL
Kulwinder Delgado (MD), Cardiovascular Disease  [address]  [address]  [place], [place] [id number]  Phone: [phone]  Fax: [phone]

## 2018-10-26 NOTE — DISCHARGE NOTE ADULT - CARE PLAN
Principal Discharge DX:	Rapid atrial fibrillation  Goal:	Resolved - Heart Rate Remain Controlled  Assessment and plan of treatment:	Atrial fibrillation is the most common heart rhythm problem.  The condition puts you at risk for has stroke and heart attack  It helps if you control your blood pressure, not drink more than 1-2 alcohol drinks per day, cut down on caffeine, getting treatment for over active thyroid gland, and get regular exercise  Call your doctor if you feel your heart racing or beating unusually, chest tightness or pain, lightheaded, faint, shortness of breath especially with exercise  It is important to take your heart medication as prescribed  You may be on anticoagulation which is very important to take as directed - you may need blood work to monitor drug levels  Secondary Diagnosis:	Near syncope  Goal:	Prevent further episodes  Assessment and plan of treatment:	HOME CARE INSTRUCTIONS  Have someone stay with you until you feel stable.  Do not drive, operate machinery, or play sports until your caregiver says it is okay.  Keep all follow-up appointments as directed by your caregiver.   Lie down right away if you start feeling like you might faint. Breathe deeply and steadily. Wait until all the symptoms have passed. Drink enough fluids to keep your urine clear or pale yellow.  If you are taking blood pressure or heart medicine, get up slowly, taking several minutes to sit and then stand. This can reduce dizziness.  SEEK IMMEDIATE MEDICAL CARE IF:  You have a severe headache.  You have unusual pain in the chest, abdomen, or back.  You are bleeding from the mouth or rectum, or you have black or tarry stool.  You have an irregular or very fast heartbeat.  You have pain with breathing.  You have repeated fainting or seizure-like jerking during an episode.  You faint when sitting or lying down.  You have confusion.  You have difficulty walking.  You have severe weakness.  You have vision problems.  If you fainted, call your local emergency services. Do not drive yourself to the hospital  Secondary Diagnosis:	Hypertensive urgency  Goal:	Resolved - Prevent by taking medications as prescribed  Assessment and plan of treatment:	Take medications for your blood pressure as recommended.  Eat a heart healthy diet that is low in saturated fats and salt, and includes whole grains, fruits, vegetables and lean protein   Exercise regularly (consult with your physician or cardiologist first); maintain a heart healthy weight.   If you smoke - quit (A resource to help you stop smoking is the [facility] – phone number 907-106-0276.). Continue to follow with your primary physician or cardiologist.   Seek medical help for dizziness, Lightheadedness, Blurry vision, Headache, Chest pain, Shortness of breath  Follow up with your medical doctor to establish long term blood pressure treatment goals.  Secondary Diagnosis:	Hypokalemia  Secondary Diagnosis:	Type 2 diabetes mellitus with complication, without long-term current use of insulin  Goal:	Controlled  Assessment and plan of treatment:	HgA1C this admission - 6.3  Make sure you get your HgA1c checked every three months.  If you take oral diabetes medications, check your blood glucose two times a day.  If you take insulin, check your blood glucose before meals and at bedtime.  It's important not to skip any meals.  Keep a log of your blood glucose results and always take it with you to your doctor appointments.  Keep a list of your current medications including injectables and over the counter medications and bring this medication list with you to all your doctor appointments.  If you have not seen your ophthalmologist this year call for appointment.  Check your feet daily for redness, sores, or openings. Do not self treat. If no improvement in two days call your primary care physician for an appointment.  Low blood sugar (hypoglycemia) is a blood sugar below 70mg/dl. Check your blood sugar if you feel signs/symptoms of hypoglycemia. If your blood sugar is below 70 take 15 grams of carbohydrates (ex 4 oz of apple juice, 3-4 glucose tablets, or 4-6 oz of regular soda) wait 15 minutes and repeat blood sugar to make sure it comes up above 70.  If your blood sugar is above 70 and you are due for a meal, have a meal.  If you are not due for a meal have a snack.  This snack helps keeps your blood sugar at a safe range.  Secondary Diagnosis:	Coronary artery disease involving native coronary artery of native heart without angina pectoris  Assessment and plan of treatment:	Coronary artery disease is a condition where the arteries the supply the heart muscle get clogged with fatty deposits & puts you at risk for a heart attack  Call your doctor if you have any new pain, pressure, or discomfort in the center of your chest, pain, tingling or discomfort in arms, back, neck, jaw, or stomach, shortness of breath, nausea, vomiting, burping or heartburn, sweating, cold and clammy skin, racing or abnormal heartbeat for more than 10 minutes or if they keep coming & going.  Call 911 and do not tr to get to hospital by care  You can help yourself with lifestyle changes (quitting smoking if you smoke), eat lots of fruits & vegetables & low fat dairy products, not a lot of meat & fatty foods, walk or some form of physical activity most days of the week, lose weight if you are overweight  Take your cardiac medication as prescribed to lower cholesterol, to lower blood pressure, aspirin to prevent blood clots, and diabetes control  Make sure to keep appointments with doctor for cardiac follow up care Principal Discharge DX:	Rapid atrial fibrillation  Goal:	Resolved - Heart Rate Remain Controlled  Assessment and plan of treatment:	Atrial fibrillation is the most common heart rhythm problem.  The condition puts you at risk for has stroke and heart attack  It helps if you control your blood pressure, not drink more than 1-2 alcohol drinks per day, cut down on caffeine, getting treatment for over active thyroid gland, and get regular exercise  Call your doctor if you feel your heart racing or beating unusually, chest tightness or pain, lightheaded, faint, shortness of breath especially with exercise  It is important to take your heart medication as prescribed  You may be on anticoagulation which is very important to take as directed - you may need blood work to monitor drug levels  Secondary Diagnosis:	Near syncope  Goal:	Prevent further episodes  Assessment and plan of treatment:	This was likely due to your rapid heart rate and uncontrolled blood pressure - it is very important to take your medications as prescribed  HOME CARE INSTRUCTIONS  Have someone stay with you until you feel stable.  Do not drive, operate machinery, or play sports until your caregiver says it is okay.  Keep all follow-up appointments as directed by your caregiver.   Lie down right away if you start feeling like you might faint. Breathe deeply and steadily. Wait until all the symptoms have passed. Drink enough fluids to keep your urine clear or pale yellow.  If you are taking blood pressure or heart medicine, get up slowly, taking several minutes to sit and then stand. This can reduce dizziness.  SEEK IMMEDIATE MEDICAL CARE IF:  You have a severe headache.  You have unusual pain in the chest, abdomen, or back.  You are bleeding from the mouth or rectum, or you have black or tarry stool.  You have an irregular or very fast heartbeat.  You have pain with breathing.  You have repeated fainting or seizure-like jerking during an episode.  You faint when sitting or lying down.  You have confusion.  You have difficulty walking.  You have severe weakness.  You have vision problems.  If you fainted, call your local emergency services. Do not drive yourself to the hospital  Secondary Diagnosis:	Hypertensive urgency  Goal:	Resolved - Prevent by taking medications as prescribed  Assessment and plan of treatment:	Take medications for your blood pressure as recommended.  Eat a heart healthy diet that is low in saturated fats and salt, and includes whole grains, fruits, vegetables and lean protein   Exercise regularly (consult with your physician or cardiologist first); maintain a heart healthy weight.   If you smoke - quit (A resource to help you stop smoking is the [facility] – phone number [phone].). Continue to follow with your primary physician or cardiologist.   Seek medical help for dizziness, Lightheadedness, Blurry vision, Headache, Chest pain, Shortness of breath  Follow up with your medical doctor to establish long term blood pressure treatment goals.  Secondary Diagnosis:	Hypokalemia  Goal:	Supplement - Monitor as outpatient  Assessment and plan of treatment:	You received potassium supplements  Incorporate more potassium rich foods in your diet such as bananas  Follow up with PCP for a repeat potassium level next week - your last potassium level was 3.4 before you received supplement on 10/26  Secondary Diagnosis:	Type 2 diabetes mellitus with complication, without long-term current use of insulin  Goal:	Controlled  Assessment and plan of treatment:	HgA1C this admission - 6.3  Make sure you get your HgA1c checked every three months.  If you take oral diabetes medications, check your blood glucose two times a day.  If you take insulin, check your blood glucose before meals and at bedtime.  It's important not to skip any meals.  Keep a log of your blood glucose results and always take it with you to your doctor appointments.  Keep a list of your current medications including injectables and over the counter medications and bring this medication list with you to all your doctor appointments.  If you have not seen your ophthalmologist this year call for appointment.  Check your feet daily for redness, sores, or openings. Do not self treat. If no improvement in two days call your primary care physician for an appointment.  Low blood sugar (hypoglycemia) is a blood sugar below 70mg/dl. Check your blood sugar if you feel signs/symptoms of hypoglycemia. If your blood sugar is below 70 take 15 grams of carbohydrates (ex 4 oz of apple juice, 3-4 glucose tablets, or 4-6 oz of regular soda) wait 15 minutes and repeat blood sugar to make sure it comes up above 70.  If your blood sugar is above 70 and you are due for a meal, have a meal.  If you are not due for a meal have a snack.  This snack helps keeps your blood sugar at a safe range.  Secondary Diagnosis:	CAD (coronary artery disease)  Goal:	Remain chest pain free  Assessment and plan of treatment:	Coronary artery disease is a condition where the arteries the supply the heart muscle get clogged with fatty deposits & puts you at risk for a heart attack  Call your doctor if you have any new pain, pressure, or discomfort in the center of your chest, pain, tingling or discomfort in arms, back, neck, jaw, or stomach, shortness of breath, nausea, vomiting, burping or heartburn, sweating, cold and clammy skin, racing or abnormal heartbeat for more than 10 minutes or if they keep coming & going.  Call 911 and do not tr to get to hospital by care  You can help yourself with lifestyle changes (quitting smoking if you smoke), eat lots of fruits & vegetables & low fat dairy products, not a lot of meat & fatty foods, walk or some form of physical activity most days of the week, lose weight if you are overweight  Take your cardiac medication as prescribed to lower cholesterol, to lower blood pressure, aspirin to prevent blood clots, and diabetes control  Make sure to keep appointments with doctor for cardiac follow up care

## 2018-10-26 NOTE — DISCHARGE NOTE ADULT - PATIENT PORTAL LINK FT
You can access the [facility] Patient Portal, offered by [facility], by registering with the following website: [email or web address]

## 2018-10-26 NOTE — DISCHARGE NOTE ADULT - MEDICATION SUMMARY - MEDICATIONS TO TAKE
I will START or STAY ON the medications listed below when I get home from the hospital:    Ranexa 1000 mg oral tablet, extended release  -- 1 tab(s) by mouth 2 times a day  -- Indication: For Coronary artery disease    apixaban 5 mg oral tablet  -- 1 tab(s) by mouth every 12 hours  -- Indication: For Atrial fibrillation    ezetimibe 10 mg oral tablet  -- 1 tab(s) by mouth once a day  -- Indication: For Coronary artery disease    Tekturna  mg-25 mg oral tablet  -- 1 tab(s) by mouth once a day  -- Indication: For Hypertension    amlodipine-atorvastatin 10 mg-80 mg oral tablet  -- 1 tab(s) by mouth once a day  -- Indication: For Hypertension    metoprolol succinate 50 mg oral tablet, extended release  -- 1 tab(s) by mouth once a day  -- Indication: For Atrial fibrillation

## 2018-10-26 NOTE — DISCHARGE NOTE ADULT - ADDITIONAL INSTRUCTIONS
Follow up with PCP in 1-2 weeks  Follow up with Cardiologist in Follow up with PCP/Cardiologist Dr. Delgado in 1 week - Need a repeat potassium level next week - your last potassium level was 3.4 before you received supplement on 10/26  Follow up with your cardiologist regarding starting aspirin as outpatient  It is very important to take all your medications as prescribed

## 2018-10-26 NOTE — CONSULT NOTE ADULT - SUBJECTIVE AND OBJECTIVE BOX
Patient seen and evaluated at bedside    Chief Complaint:    HPI:  72M w/ CAD s/p CABG, A-fib w/ pauses s/p MICRA PPM, HTN, and ? heavy EtOH use that was sent in by his out patient provider. He states that about 5 days PTA he was drinking heavily. He was having parties, and so while he was drinking he was inconsistently taking his medications. The day after while he was getting out of bed in the morning he suddenly became dizzy when he stood up. He states that the feeling of dizziness lasted just a few seconds and passed. He states that the rest of the day he was not feeling well. He has chill, a runny nose, and cough. The next day which was Wednesday he again felt dizzy in the morning while getting out of bed, so at that time he called his Cardiologist and made and apt on Thursday. In the cardiologist office he was found to be hypertensive and tachycardic, so he was sent into the ED.     He states that overall he is not a daily drinker, but does drink heavily, and over the weekend he states "I over do it." He also states that the day of admission he took his medications, but was not taking the few days leading to admission.     PMHx:   HLD (hyperlipidemia)  Atrial fibrillation  Obesity  SVT (Supraventricular Tachycardia)  DM type 2 (diabetes mellitus, type 2)  HTN (hypertension)  CAD (coronary artery disease)  MI (myocardial infarction)  Anginal pain  Obesity      PSHx:   Artificial pacemaker  History of cholecystectomy  CAD (coronary artery disease)      Allergies:  No Known Allergies      Current Medications:   aliskiren 150 milliGRAM(s) Oral daily  amLODIPine   Tablet 10 milliGRAM(s) Oral daily  apixaban 5 milliGRAM(s) Oral every 12 hours  dextrose 40% Gel 15 Gram(s) Oral once PRN  dextrose 5%. 1000 milliLiter(s) IV Continuous <Continuous>  dextrose 50% Injectable 12.5 Gram(s) IV Push once  dextrose 50% Injectable 25 Gram(s) IV Push once  dextrose 50% Injectable 25 Gram(s) IV Push once  glucagon  Injectable 1 milliGRAM(s) IntraMuscular once PRN  hydrochlorothiazide 25 milliGRAM(s) Oral daily  insulin lispro (HumaLOG) corrective regimen sliding scale   SubCutaneous three times a day before meals  insulin lispro (HumaLOG) corrective regimen sliding scale   SubCutaneous at bedtime  metoprolol succinate ER 50 milliGRAM(s) Oral daily  ranolazine 1000 milliGRAM(s) Oral two times a day      FAMILY HISTORY:  No pertinent family history in first degree relatives      Social History: Former . Lives with son and wife  Smoking History: Former smoker  Alcohol Use: Drinks multiple times a week.   Drug Use: none     REVIEW OF SYSTEMS:  Constitutional:     [ ] negative [ ] fevers [ x] chills [ ] weight loss [ ] weight gain  HEENT:                  [ ] negative [ ] dry eyes [ ] eye irritation [ ] postnasal drip [ ] nasal congestion  CV:                         [ ] negative  [ ] chest pain [ ] orthopnea [ ] palpitations [ ] murmur  Resp:                     [ ] negative [ ] cough [ ] shortness of breath [ ] dyspnea [ ] wheezing [ ] sputum [ ]hemoptysis  GI:                          [ ] negative [ ] nausea [ ] vomiting [ ] diarrhea [ ] constipation [ ] abd pain [ ] dysphagia   :                        [ ] negative [ ] dysuria [ ] nocturia [ ] hematuria [ ] increased urinary frequency  Musculoskeletal: [ ] negative [ ] back pain [ ] myalgias [ ] arthralgias [ ] fracture  Skin:                       [ ] negative [ ] rash [ ] itch  Neurological:        [ ] negative [ ] headache [ x] dizziness [ ] syncope [ ] weakness [ ] numbness  Psychiatric:           [ ] negative [ ] anxiety [ ] depression  Endocrine:            [ ] negative [ ] diabetes [ ] thyroid problem  Heme/Lymph:      [ ] negative [ ] anemia [ ] bleeding problem  Allergic/Immune: [ ] negative [ ] itchy eyes [ ] nasal discharge [ ] hives [ ] angioedema    [x ] All other systems negative  [ ] Unable to assess ROS because sedated with anoxic brain injury.      Physical Exam:  T(F): 98.1 (10-25), Max: 98.1 (10-25)  HR: 103 (10-25) (103 - 122)  BP: 164/95 (10-25) (164/95 - 210/122)  RR: 18 (10-25)  SpO2: 97% (10-25)  GENERAL: No acute distress, well-developed  HEAD:  Atraumatic, Normocephalic  ENT: EOMI, PERRLA, conjunctiva and sclera clear, Neck supple, No JVD, moist mucosa  CHEST/LUNG: Clear to auscultation bilaterally; No wheeze, equal breath sounds bilaterally   BACK: No spinal tenderness  HEART: irregularly irregular. No murmurs, rubs, or gallops  ABDOMEN: Soft, Nontender, Nondistended; Bowel sounds present  EXTREMITIES:  No clubbing, cyanosis. trace edema   PSYCH: Nl behavior, nl affect  NEUROLOGY: AAOx3, non-focal, cranial nerves intact  SKIN: Normal color, No rashes or lesions  LINES:    Cardiovascular Diagnostic Testing:    ECG: Personally reviewed:  LBBB. a-fib w/ RVR at rate of 123    Echocardiogram 10/31/17: Mild LVH, ejection fraction 60%, left atrium 5.5 cm, mild mitral regurgitation, mild tricuspid regurgitation    Stress test 5/15/2014: ejection fraction 51% with a large inferior apical defect and no ischemia.      Labs: Personally reviewed                        15.2   9.0   )-----------( clumped    ( 25 Oct 2018 15:41 )             42.4     10-25    141  |  98  |  14  ----------------------------<  100<H>  3.4<L>   |  25  |  0.89    Ca    9.8      25 Oct 2018 15:41    TPro  9.0<H>  /  Alb  5.0  /  TBili  1.0  /  DBili  x   /  AST  48<H>  /  ALT  70<H>  /  AlkPhos  110  10-25
72 year old male with a history of chronic AF S/P CABG, S/P PPM and HTN who presents with elevated BP and rapid atrial fibrillation after missing his medications for a couple of days.  With Metoprolol therapy his HR is much improved and he feels much improved.  He denies chest pain, dyspnea or palpitations       Meds:  Ranexa 1000 mg bid            Amlodipine 10 mg qd            Eliquis 5 mg bid            Tekturna mg qd            HCTZ 25 mg qd            Metoprolol ER 50 mg qd    /84  HR 95 (AF)  Lungs clear  Irregular rhythm 1/6 systolic murmur  No edema    EKG: Rapid AF   IVCD  LAD    BUN 12   Crt 0.9  CBC  normal  Troponin negative X 2    Imp: Rapid AF due to non compliance with medications.  He now feels much improved with resumption of his medications.  No evidence of acute MI or CHF.  Stable for DC to home on his current medical regimen.

## 2018-10-26 NOTE — PROGRESS NOTE ADULT - ASSESSMENT
72 yr  former smoker,  with PMH of HTN, HLD, DM Type 2 not on meds , CAD/ s/p CABG 2003, Afib ( on Eliquis, ) s/p failed DCCV .. s/p  MICRA pacemaker implant for pauses now sent by Kaushik Johnson for uncontrolled afib/ HTN..  Pt reports that he was doing overall well till two days ago when he had an episode of vertigo /imbalnace as he stood up for sitting position.. this episode was brief without CP/Palpiations/SOB /LOC /Fall...  yesterday he was " not feeling himself " for most of the day and again had another similar episode of near syncope/ vertigo but not as severe..  he did not see his cardiologist until today since Dr. Delgado was not in office yesterday ..  when seen By Dr. Delgado he was found in afib with RVR and sent for further evalaution   Denies fever / HA, Blurry vision / earache /congestion/ N/V / no urinary sx   in ED pt was found to be in HTN urgency / afib with RVR   pt reports that he has not been taking his BB for the past two days and might have skipped it  three -four days ago ( seems not to be compliant )  also of note pt does report that he used to be daily drinker and over the past 15 yrs he has siginificatly cut down to once a week.. last drink was saturday     1- near syncope : sec to afib w/ RVR  and HTN urgency   2- Afib with RVR : rate improved and pt leno not compliant with meds    d/w EP   cont current management   cont AC   PPm interrogation : episode of afib with RVR   3- HTN urgency : leno sec to noncompliance .   improved   monitor on BB   4- hypokalemia : replete     5- DM :A1c  6.3   6- CAD : not on ASA ??? f/u with Dr. Delgado     7- metabolic acidosis : mild  imrpvoed     8- elevated LFT : improved     discussed with pt and wife at length at bedside   avoid ETOH and com;ply with meds   d/w NP   D/w

## 2018-10-31 RX ORDER — IBUPROFEN 200 MG
1 TABLET ORAL
Qty: 0 | Refills: 0 | COMMUNITY

## 2018-10-31 RX ORDER — ALISKIREN HEMIFUMARATE AND HYDROCHLOROTHIAZIDE 150; 12.5 MG/1; MG/1
1 TABLET, FILM COATED ORAL
Qty: 0 | Refills: 0 | COMMUNITY

## 2018-10-31 RX ORDER — METOPROLOL TARTRATE 50 MG
1 TABLET ORAL
Qty: 0 | Refills: 0 | COMMUNITY

## 2019-06-18 NOTE — H&P CARDIOLOGY - RESPIRATORY
"Requested Prescriptions   Pending Prescriptions Disp Refills     traZODone (DESYREL) 50 MG tablet 60 tablet 1     Sig: Take 0.5-1 tablets (25-50 mg) by mouth nightly as needed for sleep       Last Written Prescription Date:  9/7/18  Last Fill Quantity: 60,  # refills: 1   Last office visit: 3/11/2019 with prescribing provider:  3/11/19   Future Office Visit:        Serotonin Modulators Passed - 6/18/2019  1:25 PM        Passed - Recent (12 mo) or future (30 days) visit within the authorizing provider's specialty     Patient had office visit in the last 12 months or has a visit in the next 30 days with authorizing provider or within the authorizing provider's specialty.  See \"Patient Info\" tab in inbasket, or \"Choose Columns\" in Meds & Orders section of the refill encounter.              Passed - Medication is active on med list        Passed - Patient is age 18 or older        Prescription approved per [facility] Ref[facility] Protocol.    Sherice Narayan RN  [facility]      " Breath Sounds equal & clear to percussion & auscultation, no accessory muscle use

## 2020-12-24 NOTE — ED PROVIDER NOTE - TEMPLATE, MLM
Received Choice form at [phone]  Agency/Facility Name: [facility]  Referral sent per Choice form @ [phone]     General

## 2024-05-12 NOTE — H&P ADULT - LYMPH NODES
[facility] ambulatory encounter  ENDOCRINOLOGY DIABETES Follow up    CHIEF COMPLAINT:   Chief Complaint   Patient presents with    Diabetes Mellitus       PRIMARY CARE PROVIDER:  Zuhair Rai MD    SUBJECTIVE:  HISTORY OF PRESENT ILLENESS  Jamar Brar is a 60 year old male who is here for  evaluation and management of uncontrolled type 2 diabetes with hyperglycemia..           Last visit was on February 13, 2024.    Labs reviewed and discussed with the patient.    Lab work from May 2024  A1c 8.7, indicates uncontrolled diabetes.  Previous A1c 7.3,11 months ago.        CMP from June 2023 reports GFR 88, LFT including AST/ALT normal.    Lipid panel in June 2023 reports , triglyceride 331.  Uncontrolled.    TSH normal 1.3,7 years ago in August 2016    Microalbuminuria resolved based on lab work done in May 2024.  Patient was previously treated with lisinopril but could not tolerate due to a rash.    CBC essentially normal in June 2023    -  Current medications for diabetes    His current medications are     1.Trulicity increased to 4.5 mg subcutaneously weekly( Increased  from 3 mg subcutaneous weekly last visit in February 2024)  2.Glipizide 5 mg twice daily and  3.Metformin 500 mg twice daily.    (started after stopping Glucovance as it contains glyburide in last visit in February 2024)    Patient says he just restarted Trulicity 4.5 mg weekly about a week ago.  Before that due to short Toujeo had to use Trulicity 3 mg weekly.  When he uses Trulicity 4.5 mg weekly patient thinks his blood sugars are markedly improved.  .Glipizide 5 mg twice daily and Metformin 500 mg twice daily are continued.    Patient follows diabetic diet.  Due to chronic back pain since 2023 after moving a heavy object at workplace is not involving in any active exercise.  He walks his dog most of daily.  He follows with a pain management.       He denied any hypoglycemia.  Glucose tablets, glucagon kit, medical ID recommend.  Patient  decided only for the glucose tab.  And he has glucose tablets. Patient denied any symptoms of uncontrolled hyperglycemia.      Patient uses freestyle Keyon 2 sensor for monitoring  Data reviewed    Very high 32 %  High 45%  Range 23%  Low 0%    Data of the individual days reviewed for the past 1 week  Blood sugars are usually in low 200s in the morning up to 3:00 p.m..  Before dinner  blood glucose is in mid 100s      Based on the current blood glucose trend  Will increase the glipizide to 10 mg with the breakfast  Continue Trulicity 4.5 mg subcutaneous weekly and metformin 500 mg twice daily unchanged.    Will reassess with a repeat A1c, lipid, CMP in 3 months.    If diabetes is uncontrolled we may have to increase the dose of metformin 2000 mg twice daily and start on basal insulin at bedtime.      Last ophthalmology evaluation was in 2022 and patient denied any diabetic damage.  Patient has not followed with the ophthalmologist.  Once again recommended patient to make an appointment.     Patient declined podiatric consult.  Denied any ulcers or calluses.  He declined foot examination today.    Dyslipidemia is treated with rosuvastatin 10 mg daily. Lipid panel from June 2023 reports triglyceride 331, .  Follow low-fat low-cholesterol diet and avoid simple sugars.  Follows primary service.  Repeat lipid panel ordered along with a CMP.       Microalbuminuria, resolved based on lab work in May 2024   patient was prescribed lisinopril 5 mg daily by the primary service but he could not tolerate due to rash.       -  Details of the history from previous visits:    Patient was previously followed by endocrinologist Dr. Llanos and last note , a year ago, dated January 11, 2023 reviewed.        Patient states he was diagnosed with diabetes in 2018 based on lab work and was started on glyburide.  Later Glucovance( glyburide 5 mg/metformin 500 mg )once daily was started.  He says he was started on Trulicity in  2021.   Patient used to work as a .  He says he was moving a heavy equipment in August 2023 and developed acute back pain.  For the back pain he received multiple steroid injections in the spine, sometimes every couple of weeks.  He denied any reason steroid injection.          He tries to follow diabetic diet.  He walks about 1 block per day and can not walk further due to back pain.  He was evaluated by diabetes educator and he declined re-consultation.     He denied any hypoglycemia, hypoglycemia unawareness, hypoglycemia needing assistance, hypoglycemic coma.  Glucose tablets, glucagon kit, medical ID recommend.  Patient decided only for the glucose tab.  Order placed.  Patient denied any symptoms of uncontrolled hyperglycemia.  .     Last ophthalmology evaluation was in 2022 and patient denied any diabetic damage.  He plans to make a follow-up appointment.     Patient declined podiatric consult.  Foot examination done today shows normal pulses equally on both sides, protective sense intact on both sides.  No ulcers or calluses noted.           Patient decided to continue the current medications knowing the possible risks.  Risk of GLP1R analogue, Trulicity, reviewed and discussed with the patient.  Patient denied any contraindications to dulaglutide , such as pancreatitis or  personal or family history of medullary thyroid cancer with MEN- 2          Discussed the risk of uncontrolled diabetes including blindness, kidney failure /dialysis, amputations, stroke /heart attack and even death.         Patient also has hyperlipidemia. Patient's last lipid panel reviewed.  Patient is currently on rosuvastatin 10 mg daily.    MEDICATIONS / ALLERGIES:  Current Outpatient Medications   Medication Sig Dispense Refill    gabapentin (NEURONTIN) 600 MG tablet Take 1 tablet by mouth in the morning and 1 tablet in the evening. 60 tablet 1    tiZANidine (ZANAFLEX) 4 MG tablet Take 1 tablet by mouth at bedtime as  needed (pain). 30 tablet 1    dulaglutide (Trulicity) 3 MG/0.5ML pen-injector Inject 3 mg into the skin every 7 days. Indications: Type 2 Diabetes 2 mL 1    Continuous Glucose Sensor (FreeStyle Keyon 2 Sensor) Misc Apply 1 device every 14 days. 2 each 2    ALPRAZolam (XANAX) 0.5 MG tablet Take 1 tablet by mouth 1 time for 1 dose. 1 tablet 0    dulaglutide (Trulicity) 4.5 MG/0.5ML pen-injector Inject 4.5 mg into the skin every 7 days. Indications: Type 2 Diabetes 6 mL 1    Dextrose, Diabetic Use, (Glucose) 1 g Chew Tab Chew 1 g by mouth as needed (may take if hypoglycemic symptoms and then check capillary blood glucose). 30 tablet 1    metFORMIN (GLUCOPHAGE) 500 MG tablet Take 1 tablet by mouth in the morning and 1 tablet in the evening. 90 tablet 1    glipiZIDE (GLUCOTROL) 5 MG tablet Take 1 tablet by mouth in the morning and 1 tablet in the evening. Take before meals. 180 tablet 1    meloxicam (MOBIC) 7.5 MG tablet Take 1 tablet by mouth daily. 30 tablet 0    rosuvastatin (CRESTOR) 10 MG tablet Take 1 tablet by mouth daily. 90 tablet 3    Multiple Vitamin (vitamin - therapeutic multivitamin) capsule MULTIVITAMINS ORAL CAPSULE      Continuous Blood Gluc  (FreeStyle Keyon 2 [place]cher) Device 1 Device 4 times daily. 1 each 0    cetirizine (ZYRTEC ALLERGY) 10 MG tablet Take 1 tablet by mouth daily. 30 tablet 0    Coenzyme Q10 (CO Q 10) 100 MG Cap       Multiple Vitamins-Minerals (MENS MULTIVITAMIN PLUS PO)        No current facility-administered medications for this visit.     Allergies as of 05/14/2024    (No Known Allergies)       REVIEW OF SYSTEMS  Constitutional: Negative for fever and chills.   Skin: Negative for rash.   HEENT: Negative for eye drainage, rhinorrhea, ear pain or sore throat.  Respiratory: Negative for cough, wheezing or shortness of breath.    Cardiovascular: Negative for chest pain, chest pressure, palpitations or diaphoresis.   Gastrointestinal: Negative for nausea, vomiting, diarrhea or  abdominal pain.   Genitourinary: Negative for dysuria, urgency, frequency, hematuria or flank pain.  Extremities: Negative for joint swelling or joint pain.  Neurologic: Negative for change in sensory or motor function.  Negative for headache.  Endocrine: Negative for heat or cold intolerance, weight loss or gain.  Hematological: Negative for bleeding, bruising or adenopathy.  Psychiatric: Negative for change in affect, change in mentation or sleep disturbance.    OBJECTIVE:  PAST HISTORIES:  I have reviewed the patient's past medical, family and social history, there are no changes.    PHYSICAL EXAM  VITAL SIGNS:  Visit Vitals  /83 (BP Location: LUE - Left upper extremity, Patient Position: Sitting)   Pulse (!) 101   Wt 108 kg (238 lb 3.2 oz)   SpO2 98%   BMI 32.31 kg/m²     Body mass index is 32.31 kg/m².    WEIGHTS:   Wt Readings from Last 4 Encounters:   05/14/24 108 kg (238 lb 3.2 oz)   02/13/24 107.6 kg (237 lb 5 oz)   12/15/23 107 kg (236 lb)   08/14/23 113.4 kg (250 lb)     GENERAL:  Oriented x3. Appears well developed and well nourished. No acute distress..   LUNGS: Non-labored respirations. Clear to auscultation bilaterally.  CARDIOVASCULAR: S1, S2, regular rate and rhythm, no murmurs.   EXTREMITIES: Normal range of motion x4. No edema or tenderness.  NEUROLOGIC: No tremors or motor deficits. Stable gait.  PSYCHIATRIC: Normal mood and affect.  FOOT EXAM:  Patient declined foot examination, denied any ulcers or calluses.    LAB RESULTS  Glucose (mg/dL)   Date Value   06/08/2023 174 (H)     Hemoglobin A1C (%)   Date Value   05/06/2024 8.7 (H)     LDL (mg/dL)   Date Value   06/08/2023 111       HDL (mg/dL)   Date Value   06/08/2023 33 (L)       Triglycerides (mg/dL)   Date Value   06/08/2023 331 (H)       Cholesterol (mg/dL)   Date Value   06/08/2023 210 (H)     Creatinine (mg/dL)   Date Value   06/08/2023 0.99      HGB (g/dL)   Date Value   06/08/2023 13.9      HCT (%)   Date Value   06/08/2023 40.2        GPT/ALT (Units/L)   Date Value   06/08/2023 24       GOT/AST (Units/L)   Date Value   06/08/2023 10      Microalbumin/ Creatinine Ratio (mg/g)   Date Value   05/06/2024 23.4       TSH (mcUnits/mL)   Date Value   08/23/2016 1.302       No results found for: \"VITD25\"    ASSESSMENT:  1. Uncontrolled type 2 diabetes with hyperglycemia.    No micro or macrovascular complications reported.    Patient had microalbuminuria but resolved based on lab work in May 2024.    Lab work from May 2024  A1c 8.7, indicates uncontrolled diabetes.  Previous A1c 7.3,11 months ago.    CGM data reviewed.      2.Dyslipidemia is treated with rosuvastatin 10 mg daily. Lipid panel from June 2023 reports triglyceride 331, .  Follow low-fat low-cholesterol diet and avoid simple sugars.  Follows primary service.  Repeat lipid panel ordered along with a CMP.       3.Microalbuminuria, Resolved based on lab work in May 2024   patient was prescribed lisinopril 5 mg daily by the primary service but he could not tolerate due to rash.              PLAN  -    Based on the current blood glucose trend  Will increase the glipizide to 10 mg with the breakfast  Continue Trulicity 4.5 mg subcutaneous weekly and metformin 500 mg twice daily unchanged.    Will reassess with a repeat A1c, lipid, CMP in 3 months.    If diabetes is uncontrolled we may have to increase the dose of metformin 1000 mg twice daily and start on basal insulin at bedtime.      Last ophthalmology evaluation was in 2022 and patient denied any diabetic damage.  Patient has not followed with the ophthalmologist.  Once again recommended patient to make an appointment.     Patient declined podiatric consult.        -  Blood sugars reviewed utilizing  meter download, Reviewed medication regimen with the patient, Changes to the current medication regimen include increasing glipizide to 10 mg with the breakfast, and Health maintenance lab work ordered for A1c, CMP, lipid.    -   Hyperlipidemia currently  uncontrolled  -  The following reflects counseling and education points for today:, reviewed glycemic targets, reviewed meter education, glucose testing and log book usage, reviewed importance of adherence to medical regimen, reviewed types of insulin and their use, reviewed hypoglycemic events, including their prevention and treatment, reviewed diet, reviewed weight management, reviewed exercise plan, reviewed all pertinent medications including use, action, storage and common side effects., and reviewed cardiovascular risks and strategies to decrease those risks    Follow-up -     Three-month follow-up appointment placed.        Assessment and plan as per the undersigned discussed with the patient and patient verbalized understanding and agreed with the plan.  Medications ,/treatment, possible side effects/ complications were discussed with the patient who expressed understanding and questions were answered by this physician.  Patient was advised to be experiencing any worsening of medical condition or no  improvement to seek medical attention.  Medications reconciled with the patient.  Notes from allother providers were reviewed .  Last lab tests/imaging results reviewed and discussed with the patient and their significance discussed.  Medications renewed if needed.  Imaging/ lab tests ordered if needed.     All or part of this dictation was completed with [employer] voice recognition software. As such, there may be uncorrected typographical errors that occur. Please consider this information when reading or reviewing this document          No lymphadedenopathy Vaccine status unknown